# Patient Record
Sex: FEMALE | Race: WHITE | Employment: FULL TIME | URBAN - METROPOLITAN AREA
[De-identification: names, ages, dates, MRNs, and addresses within clinical notes are randomized per-mention and may not be internally consistent; named-entity substitution may affect disease eponyms.]

---

## 2017-01-03 ENCOUNTER — HOSPITAL ENCOUNTER (EMERGENCY)
Age: 27
Discharge: HOME | End: 2017-01-03
Attending: EMERGENCY MEDICINE | Admitting: EMERGENCY MEDICINE
Payer: OTHER GOVERNMENT

## 2017-01-03 ENCOUNTER — APPOINTMENT (OUTPATIENT)
Dept: GENERAL RADIOLOGY | Age: 27
End: 2017-01-03
Attending: EMERGENCY MEDICINE
Payer: OTHER GOVERNMENT

## 2017-01-03 VITALS
HEART RATE: 80 BPM | BODY MASS INDEX: 24.99 KG/M2 | HEIGHT: 63 IN | DIASTOLIC BLOOD PRESSURE: 74 MMHG | WEIGHT: 141.06 LBS | RESPIRATION RATE: 16 BRPM | SYSTOLIC BLOOD PRESSURE: 105 MMHG | OXYGEN SATURATION: 99 % | TEMPERATURE: 97.7 F

## 2017-01-03 DIAGNOSIS — R07.9 CHEST PAIN, UNSPECIFIED TYPE: Primary | ICD-10-CM

## 2017-01-03 LAB
ALBUMIN SERPL BCP-MCNC: 3.7 G/DL (ref 3.5–5)
ALBUMIN/GLOB SERPL: 1.1 {RATIO} (ref 1.1–2.2)
ALP SERPL-CCNC: 50 U/L (ref 45–117)
ALT SERPL-CCNC: 23 U/L (ref 12–78)
ANION GAP BLD CALC-SCNC: 8 MMOL/L (ref 5–15)
AST SERPL W P-5'-P-CCNC: 12 U/L (ref 15–37)
ATRIAL RATE: 69 BPM
BASOPHILS # BLD AUTO: 0 K/UL (ref 0–0.1)
BASOPHILS # BLD: 0 % (ref 0–1)
BILIRUB SERPL-MCNC: 0.3 MG/DL (ref 0.2–1)
BUN SERPL-MCNC: 10 MG/DL (ref 6–20)
BUN/CREAT SERPL: 15 (ref 12–20)
CALCIUM SERPL-MCNC: 8.6 MG/DL (ref 8.5–10.1)
CALCULATED P AXIS, ECG09: 34 DEGREES
CALCULATED R AXIS, ECG10: 45 DEGREES
CALCULATED T AXIS, ECG11: 25 DEGREES
CHLORIDE SERPL-SCNC: 107 MMOL/L (ref 97–108)
CK MB CFR SERPL CALC: 1.8 % (ref 0–2.5)
CK MB SERPL-MCNC: 1.2 NG/ML (ref 5–25)
CK SERPL-CCNC: 67 U/L (ref 26–192)
CO2 SERPL-SCNC: 27 MMOL/L (ref 21–32)
CREAT SERPL-MCNC: 0.65 MG/DL (ref 0.55–1.02)
D DIMER PPP FEU-MCNC: <0.17 MG/L FEU (ref 0–0.65)
DIAGNOSIS, 93000: NORMAL
EOSINOPHIL # BLD: 0.2 K/UL (ref 0–0.4)
EOSINOPHIL NFR BLD: 2 % (ref 0–7)
ERYTHROCYTE [DISTWIDTH] IN BLOOD BY AUTOMATED COUNT: 12.9 % (ref 11.5–14.5)
GLOBULIN SER CALC-MCNC: 3.3 G/DL (ref 2–4)
GLUCOSE SERPL-MCNC: 101 MG/DL (ref 65–100)
HCG UR QL: NEGATIVE
HCT VFR BLD AUTO: 37.7 % (ref 35–47)
HGB BLD-MCNC: 12.8 G/DL (ref 11.5–16)
LYMPHOCYTES # BLD AUTO: 24 % (ref 12–49)
LYMPHOCYTES # BLD: 2.2 K/UL (ref 0.8–3.5)
MCH RBC QN AUTO: 30 PG (ref 26–34)
MCHC RBC AUTO-ENTMCNC: 34 G/DL (ref 30–36.5)
MCV RBC AUTO: 88.5 FL (ref 80–99)
MONOCYTES # BLD: 0.5 K/UL (ref 0–1)
MONOCYTES NFR BLD AUTO: 5 % (ref 5–13)
NEUTS SEG # BLD: 6.2 K/UL (ref 1.8–8)
NEUTS SEG NFR BLD AUTO: 69 % (ref 32–75)
P-R INTERVAL, ECG05: 138 MS
PLATELET # BLD AUTO: 232 K/UL (ref 150–400)
POTASSIUM SERPL-SCNC: 3.7 MMOL/L (ref 3.5–5.1)
PROT SERPL-MCNC: 7 G/DL (ref 6.4–8.2)
Q-T INTERVAL, ECG07: 394 MS
QRS DURATION, ECG06: 80 MS
QTC CALCULATION (BEZET), ECG08: 422 MS
RBC # BLD AUTO: 4.26 M/UL (ref 3.8–5.2)
SODIUM SERPL-SCNC: 142 MMOL/L (ref 136–145)
TROPONIN I SERPL-MCNC: <0.04 NG/ML
VENTRICULAR RATE, ECG03: 69 BPM
WBC # BLD AUTO: 9 K/UL (ref 3.6–11)

## 2017-01-03 PROCEDURE — 74011250636 HC RX REV CODE- 250/636: Performed by: EMERGENCY MEDICINE

## 2017-01-03 PROCEDURE — 93005 ELECTROCARDIOGRAM TRACING: CPT

## 2017-01-03 PROCEDURE — 82550 ASSAY OF CK (CPK): CPT | Performed by: EMERGENCY MEDICINE

## 2017-01-03 PROCEDURE — 99284 EMERGENCY DEPT VISIT MOD MDM: CPT

## 2017-01-03 PROCEDURE — 71020 XR CHEST PA LAT: CPT

## 2017-01-03 PROCEDURE — 85025 COMPLETE CBC W/AUTO DIFF WBC: CPT | Performed by: EMERGENCY MEDICINE

## 2017-01-03 PROCEDURE — 96374 THER/PROPH/DIAG INJ IV PUSH: CPT

## 2017-01-03 PROCEDURE — 80053 COMPREHEN METABOLIC PANEL: CPT | Performed by: EMERGENCY MEDICINE

## 2017-01-03 PROCEDURE — 36415 COLL VENOUS BLD VENIPUNCTURE: CPT | Performed by: EMERGENCY MEDICINE

## 2017-01-03 PROCEDURE — 81025 URINE PREGNANCY TEST: CPT

## 2017-01-03 PROCEDURE — 84484 ASSAY OF TROPONIN QUANT: CPT | Performed by: EMERGENCY MEDICINE

## 2017-01-03 PROCEDURE — 85379 FIBRIN DEGRADATION QUANT: CPT | Performed by: EMERGENCY MEDICINE

## 2017-01-03 RX ORDER — IBUPROFEN 600 MG/1
600 TABLET ORAL
Qty: 20 TAB | Refills: 0 | Status: SHIPPED | OUTPATIENT
Start: 2017-01-03 | End: 2017-01-29

## 2017-01-03 RX ORDER — KETOROLAC TROMETHAMINE 30 MG/ML
30 INJECTION, SOLUTION INTRAMUSCULAR; INTRAVENOUS
Status: COMPLETED | OUTPATIENT
Start: 2017-01-03 | End: 2017-01-03

## 2017-01-03 RX ADMIN — SODIUM CHLORIDE 1000 ML: 900 INJECTION, SOLUTION INTRAVENOUS at 16:46

## 2017-01-03 RX ADMIN — KETOROLAC TROMETHAMINE 30 MG: 30 INJECTION, SOLUTION INTRAMUSCULAR at 16:47

## 2017-01-03 NOTE — ED NOTES
Patient given discharge instructions. No questions or concerns at this time. Patient's VSS and in no acute distress. Patient ambulatory out of unit at discharge.

## 2017-01-03 NOTE — ED TRIAGE NOTES
Mid chest pain and sob started about 2 hours ago, +dry cough, denies fever, +n/v , denies long vehicle rides, denies leg/calf pain

## 2017-01-03 NOTE — DISCHARGE INSTRUCTIONS
Chest Pain: Care Instructions  Your Care Instructions  There are many things that can cause chest pain. Some are not serious and will get better on their own in a few days. But some kinds of chest pain need more testing and treatment. Your doctor may have recommended a follow-up visit in the next 8 to 12 hours. If you are not getting better, you may need more tests or treatment. Even though your doctor has released you, you still need to watch for any problems. The doctor carefully checked you, but sometimes problems can develop later. If you have new symptoms or if your symptoms do not get better, get medical care right away. If you have worse or different chest pain or pressure that lasts more than 5 minutes or you passed out (lost consciousness), call 911 or seek other emergency help right away. A medical visit is only one step in your treatment. Even if you feel better, you still need to do what your doctor recommends, such as going to all suggested follow-up appointments and taking medicines exactly as directed. This will help you recover and help prevent future problems. How can you care for yourself at home? · Rest until you feel better. · Take your medicine exactly as prescribed. Call your doctor if you think you are having a problem with your medicine. · Do not drive after taking a prescription pain medicine. When should you call for help? Call 911 if:  · You passed out (lost consciousness). · You have severe difficulty breathing. · You have symptoms of a heart attack. These may include:  ¨ Chest pain or pressure, or a strange feeling in your chest.  ¨ Sweating. ¨ Shortness of breath. ¨ Nausea or vomiting. ¨ Pain, pressure, or a strange feeling in your back, neck, jaw, or upper belly or in one or both shoulders or arms. ¨ Lightheadedness or sudden weakness. ¨ A fast or irregular heartbeat.   After you call 911, the  may tell you to chew 1 adult-strength or 2 to 4 low-dose aspirin. Wait for an ambulance. Do not try to drive yourself. Call your doctor today if:  · You have any trouble breathing. · Your chest pain gets worse. · You are dizzy or lightheaded, or you feel like you may faint. · You are not getting better as expected. · You are having new or different chest pain. Where can you learn more? Go to http://jadyn-dannie.info/. Enter A120 in the search box to learn more about \"Chest Pain: Care Instructions. \"  Current as of: May 27, 2016  Content Version: 11.1  © 5318-3431 [employer]. Care instructions adapted under license by [employer] (which disclaims liability or warranty for this information). If you have questions about a medical condition or this instruction, always ask your healthcare professional. [address] any warranty or liability for your use of this information. We hope that we have addressed all of your medical concerns. The examination and treatment you received in the Emergency Department were for an emergent problem and were not intended as complete care. It is important that you follow up with your healthcare provider(s) for ongoing care. If your symptoms worsen or do not improve as expected, and you are unable to reach your usual health care provider(s), you should return to the Emergency Department. Today's healthcare is undergoing tremendous change, and patient satisfaction surveys are one of the many tools to assess the quality of medical care. You may receive a survey from the [employer] organization regarding your experience in the Emergency Department. I hope that your experience has been completely positive, particularly the medical care that I provided. As such, please participate in the survey; anything less than excellent does not meet my expectations or intentions.         [address] [facility] and [facility] participate in nationally recognized quality of care measures. If your blood pressure is greater than 120/80, as reported below, we urge that you seek medical care to address the potential of high blood pressure, commonly known as hypertension. Hypertension can be hereditary or can be caused by certain medical conditions, pain, stress, or \"white coat syndrome. \"       Please make an appointment with your health care provider(s) for follow up of your Emergency Department visit. VITALS:   Patient Vitals for the past 8 hrs:   Temp Resp BP SpO2   01/03/17 1514 97.6 °F (36.4 °C) 16 108/71 98 %          Thank you for allowing us to provide you with medical care today. We realize that you have many choices for your emergency care needs. Please choose us in the future for any continued health care needs.       Leatha Miranda MD    [facility]   Office: [phone]            Recent Results (from the past 24 hour(s))   EKG, 12 LEAD, INITIAL    Collection Time: 01/03/17  3:19 PM   Result Value Ref Range    Ventricular Rate 69 BPM    Atrial Rate 69 BPM    P-R Interval 138 ms    QRS Duration 80 ms    Q-T Interval 394 ms    QTC Calculation (Bezet) 422 ms    Calculated P Axis 34 degrees    Calculated R Axis 45 degrees    Calculated T Axis 25 degrees    Diagnosis       Normal sinus rhythm  When compared with ECG of 23-DEC-2016 14:46,  No significant change was found  Confirmed by Norberto Luis MD, [facility] ([id number]) on 1/3/2017 4:03:18 PM     CBC WITH AUTOMATED DIFF    Collection Time: 01/03/17  3:25 PM   Result Value Ref Range    WBC 9.0 3.6 - 11.0 K/uL    RBC 4.26 3.80 - 5.20 M/uL    HGB 12.8 11.5 - 16.0 g/dL    HCT 37.7 35.0 - 47.0 %    MCV 88.5 80.0 - 99.0 FL    MCH 30.0 26.0 - 34.0 PG    MCHC 34.0 30.0 - 36.5 g/dL    RDW 12.9 11.5 - 14.5 %    PLATELET 074 883 - 317 K/uL    NEUTROPHILS 69 32 - 75 %    LYMPHOCYTES 24 12 - 49 %    MONOCYTES 5 5 - 13 %    EOSINOPHILS 2 0 - 7 %    BASOPHILS 0 0 - 1 % ABS. NEUTROPHILS 6.2 1.8 - 8.0 K/UL    ABS. LYMPHOCYTES 2.2 0.8 - 3.5 K/UL    ABS. MONOCYTES 0.5 0.0 - 1.0 K/UL    ABS. EOSINOPHILS 0.2 0.0 - 0.4 K/UL    ABS. BASOPHILS 0.0 0.0 - 0.1 K/UL   METABOLIC PANEL, COMPREHENSIVE    Collection Time: 01/03/17  3:25 PM   Result Value Ref Range    Sodium 142 136 - 145 mmol/L    Potassium 3.7 3.5 - 5.1 mmol/L    Chloride 107 97 - 108 mmol/L    CO2 27 21 - 32 mmol/L    Anion gap 8 5 - 15 mmol/L    Glucose 101 (H) 65 - 100 mg/dL    BUN 10 6 - 20 MG/DL    Creatinine 0.65 0.55 - 1.02 MG/DL    BUN/Creatinine ratio 15 12 - 20      GFR est AA >60 >60 ml/min/1.73m2    GFR est non-AA >60 >60 ml/min/1.73m2    Calcium 8.6 8.5 - 10.1 MG/DL    Bilirubin, total 0.3 0.2 - 1.0 MG/DL    ALT 23 12 - 78 U/L    AST 12 (L) 15 - 37 U/L    Alk. phosphatase 50 45 - 117 U/L    Protein, total 7.0 6.4 - 8.2 g/dL    Albumin 3.7 3.5 - 5.0 g/dL    Globulin 3.3 2.0 - 4.0 g/dL    A-G Ratio 1.1 1.1 - 2.2     TROPONIN I    Collection Time: 01/03/17  3:25 PM   Result Value Ref Range    Troponin-I, Qt. <0.04 <0.05 ng/mL   CK W/ CKMB & INDEX    Collection Time: 01/03/17  3:25 PM   Result Value Ref Range    CK 67 26 - 192 U/L    CK - MB 1.2 <3.6 NG/ML    CK-MB Index 1.8 0 - 2.5     D DIMER    Collection Time: 01/03/17  3:25 PM   Result Value Ref Range    D-dimer <0.17 0.00 - 0.65 mg/L FEU   HCG URINE, QL. - POC    Collection Time: 01/03/17  4:35 PM   Result Value Ref Range    Pregnancy test,urine (POC) NEGATIVE  NEG         Xr Chest Pa Lat    Result Date: 1/3/2017  Indication:  chest pain Exam: PA and lateral views of the chest. Direct comparison is made to prior CXR dated December 23, 2016. Findings: Cardiomediastinal silhouette is within normal limits. Lungs are clear bilaterally. Pleural spaces are normal. Osseous structures are intact. IMPRESSION: No acute cardiopulmonary disease.

## 2017-01-03 NOTE — ED PROVIDER NOTES
HPI Comments: 27 y.o. female with past medical history significant for migraines who presents from home with chief complaint of CP. Pt complains of sharp, constant, non-radiating CP with associated SOB and 1 episode of vomiting that began 3 hours PTA. Pt states CP is worse with breathing, and there are no relieving factors. Pt says she has been seen 2 other times within the past 2 weeks and diagnosed with a viral infection and strep throat. Pt has had dry cough for the past 2 weeks. Pt denies oral contraceptives or pain medicine. Pt denies leg swelling. There are no other acute medical concerns at this time. Note written by Bud. Waleska Tyler, as dictated by Debora Cho MD 4:11 PM      The history is provided by the patient. No  was used. Past Medical History:   Diagnosis Date    Migraines        Past Surgical History:   Procedure Laterality Date    Hx wisdom teeth extraction           History reviewed. No pertinent family history. Social History     Social History    Marital status: SINGLE     Spouse name: N/A    Number of children: N/A    Years of education: N/A     Occupational History    Not on file. Social History Main Topics    Smoking status: Never Smoker    Smokeless tobacco: Not on file    Alcohol use Yes    Drug use: Not on file    Sexual activity: Not on file     Other Topics Concern    Not on file     Social History Narrative         ALLERGIES: Imitrex [sumatriptan succinate]    Review of Systems   Constitutional: Negative for fever. HENT: Negative for facial swelling. Eyes: Negative for visual disturbance. Respiratory: Positive for cough and shortness of breath. Negative for chest tightness. Cardiovascular: Positive for chest pain. Negative for leg swelling. Gastrointestinal: Positive for vomiting. Negative for abdominal pain. Genitourinary: Negative for dysuria. Musculoskeletal: Negative for arthralgias.    Skin: Negative for rash.   Neurological: Negative for dizziness. Hematological: Negative for adenopathy. Psychiatric/Behavioral: Negative for suicidal ideas. All other systems reviewed and are negative. Vitals:    01/03/17 1514   BP: 108/71   Resp: 16   Temp: 97.6 °F (36.4 °C)   SpO2: 98%   Weight: 64 kg (141 lb 1 oz)   Height: 5' 3\" (1.6 m)            Physical Exam   Constitutional: She is oriented to person, place, and time. She appears well-developed and well-nourished. No distress. HENT:   Head: Normocephalic and atraumatic. Mouth/Throat: Oropharynx is clear and moist.   Eyes: Pupils are equal, round, and reactive to light. No scleral icterus. Neck: Normal range of motion. Neck supple. No thyromegaly present. Cardiovascular: Normal rate, regular rhythm, normal heart sounds and intact distal pulses. No murmur heard. Pulmonary/Chest: Effort normal and breath sounds normal. No respiratory distress. Abdominal: Soft. Bowel sounds are normal. She exhibits no distension. There is no tenderness. Musculoskeletal: Normal range of motion. She exhibits no edema. Neurological: She is alert and oriented to person, place, and time. Skin: Skin is warm and dry. No rash noted. She is not diaphoretic. Nursing note and vitals reviewed. Note written by Bud. Stefan Santoyo, as dictated by Precious Guy MD 4:11 PM        MDM  Number of Diagnoses or Management Options  Chest pain, unspecified type:   Diagnosis management comments: A:  Substernal CP for past few hours. Has been treated for URI/bronchitis over past 2 weeks. VS currently normal.  No h/o DVT. Not on birth control.  + SOB. DDx:  Pleurisy, effusion, pneumonia, msk pain, VTE    P:  ecg  cxr  Labs  D-dimer  Toradol/ivf  reassess    ED Course       Procedures    Labs and xray unremarkable. 4:57 PM  Patient resting comfortably with no complaints at this time. VS remain stable. Repeat physical exam is unremarkable.   Pt ambulatory without difficulty and tolerating po's well.

## 2017-01-29 ENCOUNTER — APPOINTMENT (OUTPATIENT)
Dept: GENERAL RADIOLOGY | Age: 27
End: 2017-01-29
Attending: EMERGENCY MEDICINE
Payer: OTHER GOVERNMENT

## 2017-01-29 ENCOUNTER — HOSPITAL ENCOUNTER (EMERGENCY)
Age: 27
Discharge: HOME | End: 2017-01-29
Attending: EMERGENCY MEDICINE | Admitting: EMERGENCY MEDICINE
Payer: OTHER GOVERNMENT

## 2017-01-29 VITALS
SYSTOLIC BLOOD PRESSURE: 105 MMHG | OXYGEN SATURATION: 97 % | DIASTOLIC BLOOD PRESSURE: 68 MMHG | HEART RATE: 89 BPM | BODY MASS INDEX: 24.45 KG/M2 | HEIGHT: 63 IN | RESPIRATION RATE: 16 BRPM | TEMPERATURE: 98.8 F | WEIGHT: 138 LBS

## 2017-01-29 DIAGNOSIS — R07.89 CHEST WALL PAIN: ICD-10-CM

## 2017-01-29 DIAGNOSIS — J02.9 ACUTE PHARYNGITIS, UNSPECIFIED ETIOLOGY: Primary | ICD-10-CM

## 2017-01-29 LAB
ALBUMIN SERPL BCP-MCNC: 4.2 G/DL (ref 3.5–5)
ALBUMIN/GLOB SERPL: 1.3 {RATIO} (ref 1.1–2.2)
ALP SERPL-CCNC: 54 U/L (ref 45–117)
ALT SERPL-CCNC: 19 U/L (ref 12–78)
ANION GAP BLD CALC-SCNC: 7 MMOL/L (ref 5–15)
AST SERPL W P-5'-P-CCNC: 7 U/L (ref 15–37)
BASOPHILS # BLD AUTO: 0 K/UL (ref 0–0.1)
BASOPHILS # BLD: 0 % (ref 0–1)
BILIRUB SERPL-MCNC: 0.3 MG/DL (ref 0.2–1)
BUN SERPL-MCNC: 13 MG/DL (ref 6–20)
BUN/CREAT SERPL: 16 (ref 12–20)
CALCIUM SERPL-MCNC: 8.5 MG/DL (ref 8.5–10.1)
CHLORIDE SERPL-SCNC: 103 MMOL/L (ref 97–108)
CO2 SERPL-SCNC: 28 MMOL/L (ref 21–32)
CREAT SERPL-MCNC: 0.81 MG/DL (ref 0.55–1.02)
EOSINOPHIL # BLD: 0.3 K/UL (ref 0–0.4)
EOSINOPHIL NFR BLD: 2 % (ref 0–7)
ERYTHROCYTE [DISTWIDTH] IN BLOOD BY AUTOMATED COUNT: 12.5 % (ref 11.5–14.5)
GLOBULIN SER CALC-MCNC: 3.2 G/DL (ref 2–4)
GLUCOSE SERPL-MCNC: 95 MG/DL (ref 65–100)
HCG UR QL: NEGATIVE
HCT VFR BLD AUTO: 39.4 % (ref 35–47)
HGB BLD-MCNC: 13.5 G/DL (ref 11.5–16)
LYMPHOCYTES # BLD AUTO: 10 % (ref 12–49)
LYMPHOCYTES # BLD: 1.7 K/UL (ref 0.8–3.5)
MCH RBC QN AUTO: 30.6 PG (ref 26–34)
MCHC RBC AUTO-ENTMCNC: 34.3 G/DL (ref 30–36.5)
MCV RBC AUTO: 89.3 FL (ref 80–99)
MONOCYTES # BLD: 0.9 K/UL (ref 0–1)
MONOCYTES NFR BLD AUTO: 6 % (ref 5–13)
NEUTS SEG # BLD: 13.9 K/UL (ref 1.8–8)
NEUTS SEG NFR BLD AUTO: 82 % (ref 32–75)
PLATELET # BLD AUTO: 249 K/UL (ref 150–400)
POTASSIUM SERPL-SCNC: 3.5 MMOL/L (ref 3.5–5.1)
PROT SERPL-MCNC: 7.4 G/DL (ref 6.4–8.2)
RBC # BLD AUTO: 4.41 M/UL (ref 3.8–5.2)
S PYO AG THROAT QL: NEGATIVE
SODIUM SERPL-SCNC: 138 MMOL/L (ref 136–145)
TROPONIN I SERPL-MCNC: <0.04 NG/ML
WBC # BLD AUTO: 16.9 K/UL (ref 3.6–11)

## 2017-01-29 PROCEDURE — 80053 COMPREHEN METABOLIC PANEL: CPT | Performed by: PHYSICIAN ASSISTANT

## 2017-01-29 PROCEDURE — 71020 XR CHEST PA LAT: CPT

## 2017-01-29 PROCEDURE — 87070 CULTURE OTHR SPECIMN AEROBIC: CPT | Performed by: PHYSICIAN ASSISTANT

## 2017-01-29 PROCEDURE — 74011250637 HC RX REV CODE- 250/637: Performed by: PHYSICIAN ASSISTANT

## 2017-01-29 PROCEDURE — 84484 ASSAY OF TROPONIN QUANT: CPT | Performed by: PHYSICIAN ASSISTANT

## 2017-01-29 PROCEDURE — 85025 COMPLETE CBC W/AUTO DIFF WBC: CPT | Performed by: PHYSICIAN ASSISTANT

## 2017-01-29 PROCEDURE — 93005 ELECTROCARDIOGRAM TRACING: CPT

## 2017-01-29 PROCEDURE — 99284 EMERGENCY DEPT VISIT MOD MDM: CPT

## 2017-01-29 PROCEDURE — 87880 STREP A ASSAY W/OPTIC: CPT

## 2017-01-29 PROCEDURE — 74011636637 HC RX REV CODE- 636/637: Performed by: PHYSICIAN ASSISTANT

## 2017-01-29 PROCEDURE — 81025 URINE PREGNANCY TEST: CPT

## 2017-01-29 PROCEDURE — 87147 CULTURE TYPE IMMUNOLOGIC: CPT | Performed by: PHYSICIAN ASSISTANT

## 2017-01-29 RX ORDER — PREDNISONE 20 MG/1
40 TABLET ORAL
Status: COMPLETED | OUTPATIENT
Start: 2017-01-29 | End: 2017-01-29

## 2017-01-29 RX ORDER — AMOXICILLIN AND CLAVULANATE POTASSIUM 875; 125 MG/1; MG/1
1 TABLET, FILM COATED ORAL 2 TIMES DAILY
Qty: 20 TAB | Refills: 0 | Status: SHIPPED | OUTPATIENT
Start: 2017-01-29 | End: 2017-02-08

## 2017-01-29 RX ORDER — PREDNISONE 10 MG/1
TABLET ORAL
Qty: 21 TAB | Refills: 0 | Status: SHIPPED | OUTPATIENT
Start: 2017-01-29 | End: 2017-03-20

## 2017-01-29 RX ORDER — HYDROCODONE BITARTRATE AND ACETAMINOPHEN 5; 325 MG/1; MG/1
1 TABLET ORAL
Status: COMPLETED | OUTPATIENT
Start: 2017-01-29 | End: 2017-01-29

## 2017-01-29 RX ORDER — IBUPROFEN 600 MG/1
600 TABLET ORAL
Qty: 20 TAB | Refills: 0 | Status: SHIPPED | OUTPATIENT
Start: 2017-01-29 | End: 2017-03-20

## 2017-01-29 RX ADMIN — PREDNISONE 40 MG: 20 TABLET ORAL at 21:10

## 2017-01-29 RX ADMIN — HYDROCODONE BITARTRATE AND ACETAMINOPHEN 1 TABLET: 5; 325 TABLET ORAL at 21:10

## 2017-01-29 NOTE — LETTER
[address] 
[address] [place], [place] [address]mind[facility] [id number] 
[phone] Work/School Note Date: 1/29/2017 To Whom It May concern: 
 
Fabricio Crawford was seen and treated today in the emergency room by the following provider(s): 
Attending Provider: Veena Jenkins MD 
Physician Assistant: Mary ESQUEDA JamelFaith, [place]. Fabricio Crawford may return to work on 1/31/2017.  
 
Sincerely, 
 
 
 
 
Talha Poe RN

## 2017-01-30 LAB
ATRIAL RATE: 92 BPM
CALCULATED P AXIS, ECG09: 25 DEGREES
CALCULATED R AXIS, ECG10: 30 DEGREES
CALCULATED T AXIS, ECG11: 7 DEGREES
DIAGNOSIS, 93000: NORMAL
P-R INTERVAL, ECG05: 132 MS
Q-T INTERVAL, ECG07: 362 MS
QRS DURATION, ECG06: 84 MS
QTC CALCULATION (BEZET), ECG08: 447 MS
VENTRICULAR RATE, ECG03: 92 BPM

## 2017-01-30 NOTE — ED TRIAGE NOTES
TRIAGE NOTE: Pt arrives from home with complaint of sore throat, cough, chest pain, SOB, vomiting that all started this morning. Denies fever.

## 2017-01-30 NOTE — ED PROVIDER NOTES
HPI Comments: 42-year-old female presenting ambulatory to the emergency department with complaint of increased sore throat today, cough, shortness of breath described as pain with breathing, and continued chest pain located in the mid chest for the past month. Has associated vomiting 3-4 episodes today. Mild epigastric abdominal pain. No documented fever. Denies ear pain, rash, neck tightness, diarrhea, constipation, dysuria or frequency or urgency. Patient has been evaluated previously for chest pain and shortness of breath. She reports having schedule appointment with cardiology. Minimal improvement in symptoms with ibuprofen. No recognized ill contacts    Patient is a 27 y.o. female presenting with sore throat. The history is provided by the patient. Sore Throat    This is a new problem. The current episode started 6 to 12 hours ago. The problem has not changed since onset. There has been no fever. Associated symptoms include vomiting, congestion, headaches, shortness of breath and cough. Pertinent negatives include no diarrhea, no drooling, no ear discharge, no ear pain, no stridor, no swollen glands, no trouble swallowing and no stiff neck. She has tried NSAIDs for the symptoms. The treatment provided no relief. Past Medical History:   Diagnosis Date    Migraines        Past Surgical History:   Procedure Laterality Date    Hx wisdom teeth extraction           History reviewed. No pertinent family history. Social History     Social History    Marital status: SINGLE     Spouse name: N/A    Number of children: N/A    Years of education: N/A     Occupational History    Not on file.      Social History Main Topics    Smoking status: Never Smoker    Smokeless tobacco: Not on file    Alcohol use Yes    Drug use: Not on file    Sexual activity: Not on file     Other Topics Concern    Not on file     Social History Narrative         ALLERGIES: Imitrex [sumatriptan succinate]    Review of Systems Constitutional: Negative. Negative for chills, fatigue and fever. HENT: Positive for congestion and sore throat. Negative for drooling, ear discharge, ear pain, facial swelling, rhinorrhea, sneezing and trouble swallowing. Eyes: Negative for pain, discharge and itching. Respiratory: Positive for cough and shortness of breath. Negative for chest tightness and stridor. Cardiovascular: Negative. Negative for chest pain and leg swelling. Gastrointestinal: Positive for vomiting. Negative for abdominal distention, abdominal pain, constipation, diarrhea and nausea. Genitourinary: Negative for difficulty urinating, frequency and urgency. Musculoskeletal: Negative for arthralgias, back pain, joint swelling, neck pain and neck stiffness. Skin: Negative for color change and rash. Neurological: Positive for headaches. Negative for dizziness and numbness. Psychiatric/Behavioral: Negative for confusion and decreased concentration. All other systems reviewed and are negative. Vitals:    01/29/17 1925 01/29/17 2230   BP: 120/78 105/68   Pulse: 99 89   Resp: 17 16   Temp: 99.5 °F (37.5 °C) 98.8 °F (37.1 °C)   SpO2: 100% 97%   Weight: 62.6 kg (138 lb)    Height: 5' 3\" (1.6 m)             Physical Exam   Constitutional: She is oriented to person, place, and time. She appears well-developed and well-nourished. No distress. Well appearing adult female in NAD   HENT:   Head: Normocephalic and atraumatic. Right Ear: External ear normal.   Left Ear: External ear normal.   Nose: Nose normal.   Mouth/Throat: Oropharynx is clear and moist. No oropharyngeal exudate. Eyes: Conjunctivae and EOM are normal. Pupils are equal, round, and reactive to light. Right eye exhibits no discharge. Left eye exhibits no discharge. Neck: Normal range of motion. Neck supple. Cardiovascular: Normal rate, regular rhythm and normal heart sounds. Exam reveals no friction rub. No murmur heard.   Pulmonary/Chest: Effort normal and breath sounds normal. No respiratory distress. She has no wheezes. She exhibits tenderness (mid chest wall). Abdominal: Soft. Bowel sounds are normal. She exhibits no distension. There is no tenderness. There is no rebound. Musculoskeletal: Normal range of motion. Neurological: She is alert and oriented to person, place, and time. Skin: Skin is warm and dry. No ecchymosis, no laceration, no lesion and no rash noted. Psychiatric: She has a normal mood and affect. Her behavior is normal.   Nursing note and vitals reviewed. MDM  Number of Diagnoses or Management Options  Acute pharyngitis, unspecified etiology:   Chest wall pain:   Diagnosis management comments: 28 yo female with sore throat, cough and continued chest pain x one month. Plan  POC strep, EKG, trop, chest xray, CBC, CMP, analgesia, prednisone and reassess. Mary Scott, [address]         Amount and/or Complexity of Data Reviewed  Clinical lab tests: ordered and reviewed  Tests in the radiology section of CPT®: ordered and reviewed  Independent visualization of images, tracings, or specimens: yes      ED Course       Procedures  Progress note    EKG interpretation: (Preliminary)  Rhythm: normal sinus rhythm; and regular . Rate (approx.): 85; Axis: normal; P wave: normal; QRS interval: normal ; ST/T wave: non-specific changes; in  Leads. Mary Scott, [address]    Patient's results have been reviewed with them. Patient and/or family have verbally conveyed their understanding and agreement of the patient's signs, symptoms, diagnosis, treatment and prognosis and additionally agree to follow up as recommended or return to the Emergency Room should their condition change prior to follow-up.   Discharge instructions have also been provided to the patient with some educational information regarding their diagnosis as well a list of reasons why they would want to return to the ER prior to their follow-up appointment should their condition change.  April C [place], [place]

## 2017-01-30 NOTE — DISCHARGE INSTRUCTIONS
We hope that we have addressed all of your medical concerns. The examination and treatment you received in the Emergency Department were for an emergent problem and were not intended as complete care. It is important that you follow up with your healthcare provider(s) for ongoing care. If your symptoms worsen or do not improve as expected, and you are unable to reach your usual health care provider(s), you should return to the Emergency Department. Today's healthcare is undergoing tremendous change, and patient satisfaction surveys are one of the many tools to assess the quality of medical care. You may receive a survey from the [employer] regarding your experience in the Emergency Department. I hope that your experience has been completely positive, particularly the medical care that I provided. As such, please participate in the survey; anything less than excellent does not meet my expectations or intentions. [facility] and [facility] participate in nationally recognized quality of care measures. If your blood pressure is greater than 120/80, as reported below, we urge that you seek medical care to address the potential of high blood pressure, commonly known as hypertension. Hypertension can be hereditary or can be caused by certain medical conditions, pain, stress, or \"white coat syndrome. \"       Please make an appointment with your health care provider(s) for follow up of your Emergency Department visit. VITALS:   Patient Vitals for the past 8 hrs:   Temp Pulse Resp BP SpO2   01/29/17 1925 99.5 °F (37.5 °C) 99 17 120/78 100 %          Thank you for allowing us to provide you with medical care today. We realize that you have many choices for your emergency care needs. Please choose us in the future for any continued health care needs. Regards,           April DHEERAJ.  [place], [address] [facility]. Office: [phone]            Recent Results (from the past 24 hour(s))   EKG, 12 LEAD, INITIAL    Collection Time: 01/29/17  7:56 PM   Result Value Ref Range    Ventricular Rate 92 BPM    Atrial Rate 92 BPM    P-R Interval 132 ms    QRS Duration 84 ms    Q-T Interval 362 ms    QTC Calculation (Bezet) 447 ms    Calculated P Axis 25 degrees    Calculated R Axis 30 degrees    Calculated T Axis 7 degrees    Diagnosis       Normal sinus rhythm  Nonspecific ST and T wave abnormality  When compared with ECG of 03-JAN-2017 15:19,  T wave inversion now evident in Anterior leads     HCG URINE, QL. - POC    Collection Time: 01/29/17  8:05 PM   Result Value Ref Range    Pregnancy test,urine (POC) NEGATIVE  NEG     POC GROUP A STREP    Collection Time: 01/29/17  8:05 PM   Result Value Ref Range    Group A strep (POC) NEGATIVE  NEG     CBC WITH AUTOMATED DIFF    Collection Time: 01/29/17  9:05 PM   Result Value Ref Range    WBC 16.9 (H) 3.6 - 11.0 K/uL    RBC 4.41 3.80 - 5.20 M/uL    HGB 13.5 11.5 - 16.0 g/dL    HCT 39.4 35.0 - 47.0 %    MCV 89.3 80.0 - 99.0 FL    MCH 30.6 26.0 - 34.0 PG    MCHC 34.3 30.0 - 36.5 g/dL    RDW 12.5 11.5 - 14.5 %    PLATELET 483 770 - 291 K/uL    NEUTROPHILS 82 (H) 32 - 75 %    LYMPHOCYTES 10 (L) 12 - 49 %    MONOCYTES 6 5 - 13 %    EOSINOPHILS 2 0 - 7 %    BASOPHILS 0 0 - 1 %    ABS. NEUTROPHILS 13.9 (H) 1.8 - 8.0 K/UL    ABS. LYMPHOCYTES 1.7 0.8 - 3.5 K/UL    ABS. MONOCYTES 0.9 0.0 - 1.0 K/UL    ABS. EOSINOPHILS 0.3 0.0 - 0.4 K/UL    ABS.  BASOPHILS 0.0 0.0 - 0.1 K/UL   METABOLIC PANEL, COMPREHENSIVE    Collection Time: 01/29/17  9:05 PM   Result Value Ref Range    Sodium 138 136 - 145 mmol/L    Potassium 3.5 3.5 - 5.1 mmol/L    Chloride 103 97 - 108 mmol/L    CO2 28 21 - 32 mmol/L    Anion gap 7 5 - 15 mmol/L    Glucose 95 65 - 100 mg/dL    BUN 13 6 - 20 MG/DL    Creatinine 0.81 0.55 - 1.02 MG/DL    BUN/Creatinine ratio 16 12 - 20      GFR est AA >60 >60 ml/min/1.73m2    GFR est non-AA >60 >60 ml/min/1.73m2    Calcium 8.5 8.5 - 10.1 MG/DL    Bilirubin, total 0.3 0.2 - 1.0 MG/DL    ALT 19 12 - 78 U/L    AST 7 (L) 15 - 37 U/L    Alk. phosphatase 54 45 - 117 U/L    Protein, total 7.4 6.4 - 8.2 g/dL    Albumin 4.2 3.5 - 5.0 g/dL    Globulin 3.2 2.0 - 4.0 g/dL    A-G Ratio 1.3 1.1 - 2.2     TROPONIN I    Collection Time: 01/29/17  9:05 PM   Result Value Ref Range    Troponin-I, Qt. <0.04 <0.05 ng/mL       Xr Chest Pa Lat    Result Date: 1/29/2017  EXAM:  XR CHEST PA LAT INDICATION:  Cough with chest pain and shortness of breath since this morning. COMPARISON: 1/3/2017 TECHNIQUE: PA and lateral chest views FINDINGS: The cardiomediastinal contours are stable. The pulmonary vasculature is within normal limits. The lungs and pleural spaces are clear. The bones and upper abdomen are stable. IMPRESSION: There is no acute process. Stable exam.         Musculoskeletal Chest Pain: Care Instructions  Your Care Instructions  Chest pain is not always a sign that something is wrong with your heart or that you have another serious problem. The doctor thinks your chest pain is caused by strained muscles or ligaments, inflamed chest cartilage, or another problem in your chest, rather than by your heart. You may need more tests to find the cause of your chest pain. Follow-up care is a key part of your treatment and safety. Be sure to make and go to all appointments, and call your doctor if you are having problems. Its also a good idea to know your test results and keep a list of the medicines you take. How can you care for yourself at home? · Take pain medicines exactly as directed. ¨ If the doctor gave you a prescription medicine for pain, take it as prescribed. ¨ If you are not taking a prescription pain medicine, ask your doctor if you can take an over-the-counter medicine. · Rest and protect the sore area. · Stop, change, or take a break from any activity that may be causing your pain or soreness.   · Put ice or a cold pack on the sore area for 10 to 20 minutes at a time. Try to do this every 1 to 2 hours for the next 3 days (when you are awake) or until the swelling goes down. Put a thin cloth between the ice and your skin. · After 2 or 3 days, apply a heating pad set on low or a warm cloth to the area that hurts. Some doctors suggest that you go back and forth between hot and cold. · Do not wrap or tape your ribs for support. This may cause you to take smaller breaths, which could increase your risk of lung problems. · Mentholated creams such as Bengay or Icy Hot may soothe sore muscles. Follow the instructions on the package. · Follow your doctor's instructions for exercising. · Gentle stretching and massage may help you get better faster. Stretch slowly to the point just before pain begins, and hold the stretch for at least 15 to 30 seconds. Do this 3 or 4 times a day. Stretch just after you have applied heat. · As your pain gets better, slowly return to your normal activities. Any increased pain may be a sign that you need to rest a while longer. When should you call for help? Call 911 anytime you think you may need emergency care. For example, call if:  · You have chest pain or pressure. This may occur with:  ¨ Sweating. ¨ Shortness of breath. ¨ Nausea or vomiting. ¨ Pain that spreads from the chest to the neck, jaw, or one or both shoulders or arms. ¨ Dizziness or lightheadedness. ¨ A fast or uneven pulse. After calling 911, chew 1 adult-strength aspirin. Wait for an ambulance. Do not try to drive yourself. · You have sudden chest pain and shortness of breath, or you cough up blood. Call your doctor now or seek immediate medical care if:  · You have any trouble breathing. · Your chest pain gets worse.   · Your chest pain occurs consistently with exercise and is relieved by rest.  Watch closely for changes in your health, and be sure to contact your doctor if:  · Your chest pain does not get better after 1 week. Where can you learn more? Go to http://jadyn-dannie.info/. Enter V293 in the search box to learn more about \"Musculoskeletal Chest Pain: Care Instructions. \"  Current as of: May 27, 2016  Content Version: 11.1  © 7287-2813 [employer]. Care instructions adapted under license by [employer] (which disclaims liability or warranty for this information). If you have questions about a medical condition or this instruction, always ask your healthcare professional. [address] any warranty or liability for your use of this information. Sore Throat: Care Instructions  Your Care Instructions    Infection by bacteria or a virus causes most sore throats. Cigarette smoke, dry air, air pollution, allergies, and yelling can also cause a sore throat. Sore throats can be painful and annoying. Fortunately, most sore throats go away on their own. If you have a bacterial infection, your doctor may prescribe antibiotics. Follow-up care is a key part of your treatment and safety. Be sure to make and go to all appointments, and call your doctor if you are having problems. It's also a good idea to know your test results and keep a list of the medicines you take. How can you care for yourself at home? · If your doctor prescribed antibiotics, take them as directed. Do not stop taking them just because you feel better. You need to take the full course of antibiotics. · Gargle with warm salt water once an hour to help reduce swelling and relieve discomfort. Use 1 teaspoon of salt mixed in 1 cup of warm water. · Take an over-the-counter pain medicine, such as acetaminophen (Tylenol), ibuprofen (Advil, Motrin), or naproxen (Aleve). Read and follow all instructions on the label. · Be careful when taking over-the-counter cold or flu medicines and Tylenol at the same time. Many of these medicines have acetaminophen, which is Tylenol.  Read the labels to make sure that you are not taking more than the recommended dose. Too much acetaminophen (Tylenol) can be harmful. · Drink plenty of fluids. Fluids may help soothe an irritated throat. Hot fluids, such as tea or soup, may help decrease throat pain. · Use over-the-counter throat lozenges to soothe pain. Regular cough drops or hard candy may also help. These should not be given to young children because of the risk of choking. · Do not smoke or allow others to smoke around you. If you need help quitting, talk to your doctor about stop-smoking programs and medicines. These can increase your chances of quitting for good. · Use a vaporizer or humidifier to add moisture to your bedroom. Follow the directions for cleaning the machine. When should you call for help? Call your doctor now or seek immediate medical care if:  · You have new or worse trouble swallowing. · Your sore throat gets much worse on one side. Watch closely for changes in your health, and be sure to contact your doctor if you do not get better as expected. Where can you learn more? Go to http://jadyn-dannie.info/. Enter [phone] [id number] [phone] in the search box to learn more about \"Sore Throat: Care Instructions. \"  Current as of: July 29, 2016  Content Version: 11.1  © 7896-3664 [employer]. Care instructions adapted under license by [email or web address] (which disclaims liability or warranty for this information). If you have questions about a medical condition or this instruction, always ask your healthcare professional. [place] [address] any warranty or liability for your use of this information.

## 2017-01-31 LAB
BACTERIA SPEC CULT: NORMAL
BACTERIA SPEC CULT: NORMAL
SERVICE CMNT-IMP: NORMAL

## 2017-03-20 ENCOUNTER — HOSPITAL ENCOUNTER (EMERGENCY)
Age: 27
Discharge: HOME | End: 2017-03-20
Attending: EMERGENCY MEDICINE
Payer: OTHER GOVERNMENT

## 2017-03-20 VITALS
BODY MASS INDEX: 24.63 KG/M2 | HEART RATE: 81 BPM | HEIGHT: 63 IN | OXYGEN SATURATION: 98 % | WEIGHT: 139 LBS | TEMPERATURE: 98.9 F | SYSTOLIC BLOOD PRESSURE: 113 MMHG | RESPIRATION RATE: 18 BRPM | DIASTOLIC BLOOD PRESSURE: 76 MMHG

## 2017-03-20 DIAGNOSIS — G62.9 PERIPHERAL POLYNEUROPATHY: Primary | ICD-10-CM

## 2017-03-20 PROCEDURE — 99283 EMERGENCY DEPT VISIT LOW MDM: CPT

## 2017-03-20 RX ORDER — METHYLPREDNISOLONE 4 MG/1
TABLET ORAL
Qty: 1 DOSE PACK | Refills: 0 | Status: SHIPPED | OUTPATIENT
Start: 2017-03-20 | End: 2017-08-19

## 2017-03-21 NOTE — ED PROVIDER NOTES
Patient is a 27 y.o. female presenting with numbness. The history is provided by the patient. Numbness   This is a new problem. Episode onset: over 2 weeks. Progression since onset: waxing and waning but gradually worsening. Affected Side: both feet. Pertinent negatives include no focal weakness. There has been no fever. Pertinent negatives include no shortness of breath, no chest pain, no vomiting, no altered mental status, no confusion, no headaches, no nausea and no bowel incontinence. Past Medical History:   Diagnosis Date    Migraines        Past Surgical History:   Procedure Laterality Date    HX WISDOM TEETH EXTRACTION           History reviewed. No pertinent family history. Social History     Social History    Marital status: SINGLE     Spouse name: N/A    Number of children: N/A    Years of education: N/A     Occupational History    Not on file. Social History Main Topics    Smoking status: Never Smoker    Smokeless tobacco: Not on file    Alcohol use Yes      Comment: Occ    Drug use: No    Sexual activity: Not on file     Other Topics Concern    Not on file     Social History Narrative         ALLERGIES: Other food and Imitrex [sumatriptan succinate]    Review of Systems   Constitutional: Negative for chills and fever. Respiratory: Negative for chest tightness and shortness of breath. Cardiovascular: Negative for chest pain. Gastrointestinal: Negative for abdominal pain, bowel incontinence, nausea and vomiting. Musculoskeletal: Positive for back pain. Negative for neck pain. Neurological: Positive for numbness. Negative for dizziness, focal weakness, weakness and headaches. Psychiatric/Behavioral: Negative for confusion. All other systems reviewed and are negative.       Vitals:    03/20/17 2010   BP: 113/76   Pulse: 81   Resp: 18   Temp: 98.9 °F (37.2 °C)   SpO2: 98%   Weight: 63 kg (139 lb)   Height: 5' 3\" (1.6 m)            Physical Exam   Constitutional: She is oriented to person, place, and time. She appears well-developed and well-nourished. No distress. HENT:   Head: Normocephalic and atraumatic. Eyes: Conjunctivae and EOM are normal.   Neck: Normal range of motion. Cardiovascular: Normal rate, regular rhythm, normal heart sounds and intact distal pulses. No murmur heard. Pulmonary/Chest: Effort normal and breath sounds normal. No stridor. No respiratory distress. Abdominal: Soft. Bowel sounds are normal. There is no tenderness. Musculoskeletal: Normal range of motion. She exhibits no edema or deformity. Mild tenderness in lower lumbar back   Neurological: She is alert and oriented to person, place, and time. She has normal strength and normal reflexes. No cranial nerve deficit. Coordination normal. GCS eye subscore is 4. GCS verbal subscore is 5. GCS motor subscore is 6. Reflex Scores:       Patellar reflexes are 2+ on the right side and 2+ on the left side. Achilles reflexes are 2+ on the right side and 2+ on the left side. Intact to light touch in toes, decreased proprioception  No loss of balance, ambulatory without assistance   Skin: Skin is warm and dry. She is not diaphoretic. Psychiatric: She has a normal mood and affect. Nursing note and vitals reviewed. MDM  Number of Diagnoses or Management Options  Peripheral polyneuropathy:   Diagnosis management comments: Patient with peripheral neuropathy - refer to PCP and her neurologist to discuss symptoms and possibly get a lumbar MRI (she has back pain) will start steroids and see if that helps.   No immediate concern for cauda equina - patient to return if symptoms worsen    Patient Progress  Patient progress: stable    ED Course       Procedures

## 2017-03-21 NOTE — ED TRIAGE NOTES
Pt reports two weeks of intermittent numbness in between her toes and in her feet, bilateral.  Today the numbness spread into her lower legs. Pt denies any known injury to back or feet.

## 2017-03-21 NOTE — DISCHARGE INSTRUCTIONS
Neuropathic Pain: Care Instructions  Your Care Instructions  Neuropathic pain is caused by pressure on or damage to your nerves. It's often simply called nerve pain. Some people feel this type of pain all the time. For others, it comes and goes. Diabetes, shingles, or an injury can cause nerve pain. Many people say the pain feels sharp, burning, or stabbing. But some people feel it as a dull ache. In some cases, it makes your skin very sensitive. So touch, pressure, and other sensations that did not hurt before may now cause pain. It's important to know that this kind of pain is real and can affect your quality of life. It's also important to know that treatment can help. Treatment includes pain medicines, exercise, and physical therapy. Medicines can help reduce the number of pain signals that travel over the nerves. This can make the painful areas less sensitive. It can also help you sleep better and improve your mood. But medicines are only one part of successful treatment. Most people do best with more than one kind of treatment. Your doctor may recommend that you try cognitive-behavioral therapy and stress management. Or, if needed, you may decide to try to quit smoking, lower your blood pressure, or better control blood sugar. These kinds of healthy changes can also make a difference. If you feel that your treatment is not working, talk to your doctor. And be sure to tell your doctor if you think you might be depressed or anxious. These are common problems that can also be treated. Follow-up care is a key part of your treatment and safety. Be sure to make and go to all appointments, and call your doctor if you are having problems. It's also a good idea to know your test results and keep a list of the medicines you take. How can you care for yourself at home? · Be safe with medicines. Read and follow all instructions on the label.   ¨ If the doctor gave you a prescription medicine for pain, take it as prescribed. ¨ If you are not taking a prescription pain medicine, ask your doctor if you can take an over-the-counter medicine. · Save hard tasks for days when you have less pain. Follow a hard task with an easy task. And remember to take breaks. · Relax, and reduce stress. You may want to try deep breathing or meditation. These can help. · Keep moving. Gentle, daily exercise can help reduce pain. Your doctor or physical therapist can tell you what type of exercise is best for you. This may include walking, swimming, and stationary biking. It may also include stretches and range-of-motion exercises. · Try heat, cold packs, and massage. · Get enough sleep. Constant pain can make you more tired. If the pain makes it hard to sleep, talk with your doctor. · Think positively. Your thoughts can affect your pain. Do fun things to distract yourself from the pain. See a movie, read a book, listen to music, or spend time with a friend. · Keep a pain diary. Try to write down how strong your pain is and what it feels like. Also try to notice and write down how your moods, thoughts, sleep, activities, and medicine affect your pain. These notes can help you and your doctor find the best ways to treat your pain. Reducing constipation caused by pain medicine  Pain medicines often cause constipation. To reduce constipation:  · Include fruits, vegetables, beans, and whole grains in your diet each day. These foods are high in fiber. · Drink plenty of fluids, enough so that your urine is light yellow or clear like water. If you have kidney, heart, or liver disease and have to limit fluids, talk with your doctor before you increase the amount of fluids you drink. · Get some exercise every day. Build up slowly to 30 to 60 minutes a day on 5 or more days of the week. · Take a fiber supplement, such as Citrucel or Metamucil, every day if needed. Read and follow all instructions on the label.   · Schedule time each day for a bowel movement. Having a daily routine may help. Take your time and do not strain when having a bowel movement. · Ask your doctor about a laxative. The goal is to have one easy bowel movement every 1 to 2 days. Do not let constipation go untreated for more than 3 days. When should you call for help? Call your doctor now or seek immediate medical care if:  · You feel sad, anxious, or hopeless for more than a few days. This could mean you are depressed. Depression is common in people who have a lot of pain. But it can be treated. · You have trouble with bowel movements, such as:  ¨ No bowel movement in 3 days. ¨ Blood in the anal area, in your stool, or on the toilet paper. ¨ Diarrhea for more than 24 hours. Watch closely for changes in your health, and be sure to contact your doctor if:  · Your pain is getting worse. · You can't sleep because of pain. · You are very worried or anxious about your pain. · You have trouble taking your pain medicine. · You have any concerns about your pain medicine or its side effects. · You have vomiting or cramps for more than 2 hours. Where can you learn more? Go to http://jadyn-dannie.info/. Enter B075 in the search box to learn more about \"Neuropathic Pain: Care Instructions. \"  Current as of: February 19, 2016  Content Version: 11.1  © 6535-1135 [employer]. Care instructions adapted under license by [employer] (which disclaims liability or warranty for this information). If you have questions about a medical condition or this instruction, always ask your healthcare professional. [place] [address] any warranty or liability for your use of this information. We hope that we have addressed all of your medical concerns. The examination and treatment you received in the Emergency Department were for an emergent problem and were not intended as complete care.  It is important that you follow up with your healthcare provider(s) for ongoing care. If your symptoms worsen or do not improve as expected, and you are unable to reach your usual health care provider(s), you should return to the Emergency Department. Today's healthcare is undergoing tremendous change, and patient satisfaction surveys are one of the many tools to assess the quality of medical care. You may receive a survey from the [employer] regarding your experience in the Emergency Department. I hope that your experience has been completely positive, particularly the medical care that I provided. As such, please participate in the survey; anything less than excellent does not meet my expectations or intentions. [address] [facility] and [address] [place] [address] participate in nationally recognized quality of care measures. If your blood pressure is greater than 120/80, as reported below, we urge that you seek medical care to address the potential of high blood pressure, commonly known as hypertension. Hypertension can be hereditary or can be caused by certain medical conditions, pain, stress, or \"white coat syndrome. \"       Please make an appointment with your health care provider(s) for follow up of your Emergency Department visit. VITALS:   Patient Vitals for the past 8 hrs:   Temp Pulse Resp BP SpO2   03/20/17 2010 98.9 °F (37.2 °C) 81 18 113/76 98 %          Thank you for allowing us to provide you with medical care today. We realize that you have many choices for your emergency care needs. Please choose us in the future for any continued health care needs. Kalie Velizhand, [address] [place] [address] 20.   Office: [phone]

## 2017-04-13 ENCOUNTER — APPOINTMENT (OUTPATIENT)
Dept: CT IMAGING | Age: 27
End: 2017-04-13
Attending: NURSE PRACTITIONER
Payer: OTHER GOVERNMENT

## 2017-04-13 ENCOUNTER — HOSPITAL ENCOUNTER (EMERGENCY)
Age: 27
Discharge: HOME | End: 2017-04-13
Attending: EMERGENCY MEDICINE
Payer: OTHER GOVERNMENT

## 2017-04-13 ENCOUNTER — APPOINTMENT (OUTPATIENT)
Dept: MRI IMAGING | Age: 27
End: 2017-04-13
Attending: NURSE PRACTITIONER
Payer: OTHER GOVERNMENT

## 2017-04-13 VITALS
WEIGHT: 133.5 LBS | OXYGEN SATURATION: 99 % | SYSTOLIC BLOOD PRESSURE: 108 MMHG | DIASTOLIC BLOOD PRESSURE: 70 MMHG | TEMPERATURE: 98.8 F | BODY MASS INDEX: 23.65 KG/M2 | HEART RATE: 79 BPM | HEIGHT: 63 IN | RESPIRATION RATE: 16 BRPM

## 2017-04-13 DIAGNOSIS — H53.9 VISUAL CHANGES: Primary | ICD-10-CM

## 2017-04-13 LAB
ALBUMIN SERPL BCP-MCNC: 4.1 G/DL (ref 3.5–5)
ALBUMIN/GLOB SERPL: 1.2 {RATIO} (ref 1.1–2.2)
ALP SERPL-CCNC: 49 U/L (ref 45–117)
ALT SERPL-CCNC: 20 U/L (ref 12–78)
ANION GAP BLD CALC-SCNC: 6 MMOL/L (ref 5–15)
AST SERPL W P-5'-P-CCNC: 9 U/L (ref 15–37)
BASOPHILS # BLD AUTO: 0 K/UL (ref 0–0.1)
BASOPHILS # BLD: 0 % (ref 0–1)
BILIRUB SERPL-MCNC: 0.4 MG/DL (ref 0.2–1)
BUN SERPL-MCNC: 10 MG/DL (ref 6–20)
BUN/CREAT SERPL: 16 (ref 12–20)
CALCIUM SERPL-MCNC: 8.8 MG/DL (ref 8.5–10.1)
CHLORIDE SERPL-SCNC: 106 MMOL/L (ref 97–108)
CO2 SERPL-SCNC: 26 MMOL/L (ref 21–32)
CREAT SERPL-MCNC: 0.63 MG/DL (ref 0.55–1.02)
EOSINOPHIL # BLD: 0.2 K/UL (ref 0–0.4)
EOSINOPHIL NFR BLD: 2 % (ref 0–7)
ERYTHROCYTE [DISTWIDTH] IN BLOOD BY AUTOMATED COUNT: 12.6 % (ref 11.5–14.5)
GLOBULIN SER CALC-MCNC: 3.4 G/DL (ref 2–4)
GLUCOSE SERPL-MCNC: 93 MG/DL (ref 65–100)
HCG UR QL: NEGATIVE
HCT VFR BLD AUTO: 40 % (ref 35–47)
HGB BLD-MCNC: 13.6 G/DL (ref 11.5–16)
LYMPHOCYTES # BLD AUTO: 28 % (ref 12–49)
LYMPHOCYTES # BLD: 2.1 K/UL (ref 0.8–3.5)
MCH RBC QN AUTO: 30.4 PG (ref 26–34)
MCHC RBC AUTO-ENTMCNC: 34 G/DL (ref 30–36.5)
MCV RBC AUTO: 89.3 FL (ref 80–99)
MONOCYTES # BLD: 0.3 K/UL (ref 0–1)
MONOCYTES NFR BLD AUTO: 4 % (ref 5–13)
NEUTS SEG # BLD: 5.1 K/UL (ref 1.8–8)
NEUTS SEG NFR BLD AUTO: 66 % (ref 32–75)
PLATELET # BLD AUTO: 274 K/UL (ref 150–400)
POTASSIUM SERPL-SCNC: 3.8 MMOL/L (ref 3.5–5.1)
PROT SERPL-MCNC: 7.5 G/DL (ref 6.4–8.2)
RBC # BLD AUTO: 4.48 M/UL (ref 3.8–5.2)
SODIUM SERPL-SCNC: 138 MMOL/L (ref 136–145)
WBC # BLD AUTO: 7.7 K/UL (ref 3.6–11)

## 2017-04-13 PROCEDURE — 81025 URINE PREGNANCY TEST: CPT

## 2017-04-13 PROCEDURE — 74011250636 HC RX REV CODE- 250/636

## 2017-04-13 PROCEDURE — 99284 EMERGENCY DEPT VISIT MOD MDM: CPT

## 2017-04-13 PROCEDURE — 36415 COLL VENOUS BLD VENIPUNCTURE: CPT | Performed by: NURSE PRACTITIONER

## 2017-04-13 PROCEDURE — 74011250636 HC RX REV CODE- 250/636: Performed by: NURSE PRACTITIONER

## 2017-04-13 PROCEDURE — 70553 MRI BRAIN STEM W/O & W/DYE: CPT

## 2017-04-13 PROCEDURE — 80053 COMPREHEN METABOLIC PANEL: CPT | Performed by: NURSE PRACTITIONER

## 2017-04-13 PROCEDURE — 70450 CT HEAD/BRAIN W/O DYE: CPT

## 2017-04-13 PROCEDURE — 85025 COMPLETE CBC W/AUTO DIFF WBC: CPT | Performed by: NURSE PRACTITIONER

## 2017-04-13 PROCEDURE — A9585 GADOBUTROL INJECTION: HCPCS | Performed by: NURSE PRACTITIONER

## 2017-04-13 PROCEDURE — A9585 GADOBUTROL INJECTION: HCPCS

## 2017-04-13 RX ADMIN — GADOBUTROL 6 ML: 604.72 INJECTION INTRAVENOUS at 20:00

## 2017-04-13 NOTE — ED TRIAGE NOTES
Pt states that she has been having intermitted blurry vision in both eyes that started about three days ago, pt states that two days ago she \"saw smoke\" in her right eye. Pt states that she had a migraine headache start at 3am, but the headache has stopped now. Pt denies double vision.

## 2017-04-13 NOTE — Clinical Note
Follow up with Neurology as soon as possible Return to the ED if condition worsens and/or new symptoms develop

## 2017-04-14 NOTE — ED NOTES
Pt signed out awaiting MRI; plan to discharge if negative with Neuro followup. MRI with no acute finding; pt to be discharged with Neuro followup; return if change or worsening of symptoms.  RORY Crandall

## 2017-04-14 NOTE — DISCHARGE INSTRUCTIONS
Headache: After Your Visit to the Emergency Room  Your Care Instructions  Headaches have many possible causes. Most headaches are not a sign of serious problems and will get better on their own. Home treatment may help you feel better soon. Even though you have been released from the emergency room, you still need to watch for any problems. The doctor carefully checked you. But sometimes problems can develop later. If you have new symptoms, or if your symptoms do not get better, return to the emergency room or call your doctor right away. A visit to the emergency room is only one step in your treatment. Even if you feel better, you still need to do what your doctor recommends, such as going to all suggested follow-up appointments and taking medicines exactly as directed. This will help you recover and help prevent future problems. How can you care for yourself at home? · Have someone drive you home if you have taken pain medicine. Do not drive yourself. · Rest in a quiet, dark room until your headache is gone. Close your eyes, and try to relax or go to sleep. Do not watch TV or read. · Put ice or a cold pack on the area for 10 to 20 minutes at a time. Put a thin cloth between the ice and your skin. · Use a warm, moist towel or a heating pad set on low to relax tight shoulder and neck muscles. · Have someone gently massage your neck and shoulders. · Take pain medicines exactly as directed. ¨ If the doctor gave you a prescription medicine for pain, take it as prescribed. ¨ If you are not taking a prescription pain medicine, ask your doctor if you can take an over-the-counter medicine. · Limit your caffeine intake to 1 to 2 cups a day. People who drink a lot of caffeine often get a headache several hours after their last drink of caffeine. Or they may wake up with a headache that is relieved by drinking caffeine. Cut down slowly to avoid caffeine-withdrawal headaches.   · Seek help if you have depression or anxiety. Your headaches may be linked to these conditions. When should you call for help? Call 911 if:  · You have signs of a stroke. These may include:  ¨ Sudden numbness, paralysis, or weakness in your face, arm, or leg, especially on only one side of your body. ¨ New problems with walking or balance. ¨ Sudden vision changes. ¨ Drooling or slurred speech. ¨ New problems speaking or understanding simple statements, or feeling confused. ¨ A sudden, severe headache that is different from past headaches. · You have other symptoms that you think are a medical emergency. Return to the emergency room now if:  · You get a fever and a stiff neck. · Your headache gets worse. · You have new nausea and vomiting, or you cannot keep down food or liquids. Call your doctor today if:  · Your headache does not get better within 1 to 2 days. · Your headaches get worse or happen more often. Where can you learn more? Go to Knozen.be  Enter D038 in the search box to learn more about \"Headache: After Your Visit to the Emergency Room. \"   © 4833-8647 Healthwise, Incorporated. Care instructions adapted under license by Reymundo Scott (which disclaims liability or warranty for this information). This care instruction is for use with your licensed healthcare professional. If you have questions about a medical condition or this instruction, always ask your healthcare professional. [address] any warranty or liability for your use of this information. Content Version: 9.3.68129; Last Revised: October 20, 2011               We hope that we have addressed all of your medical concerns. The examination and treatment you received in the Emergency Department were for an emergent problem and were not intended as complete care. It is important that you follow up with your healthcare provider(s) for ongoing care.  If your symptoms worsen or do not improve as expected, and you are unable to reach your usual health care provider(s), you should return to the Emergency Department. Today's healthcare is undergoing tremendous change, and patient satisfaction surveys are one of the many tools to assess the quality of medical care. You may receive a survey from the [employer] regarding your experience in the Emergency Department. I hope that your experience has been completely positive, particularly the medical care that I provided. As such, please participate in the survey; anything less than excellent does not meet my expectations or intentions. [facility] and 8 [facility] participate in nationally recognized quality of care measures. If your blood pressure is greater than 120/80, as reported below, we urge that you seek medical care to address the potential of high blood pressure, commonly known as hypertension. Hypertension can be hereditary or can be caused by certain medical conditions, pain, stress, or \"white coat syndrome. \"       Please make an appointment with your health care provider(s) for follow up of your Emergency Department visit. VITALS:   Patient Vitals for the past 8 hrs:   Temp Pulse Resp BP SpO2   04/13/17 1623 98.4 °F (36.9 °C) 66 16 117/86 99 %          Thank you for allowing us to provide you with medical care today. We realize that you have many choices for your emergency care needs. Please choose us in the future for any continued health care needs. Mich Mccullough, [address]  [place].   Office: [phone]            Recent Results (from the past 24 hour(s))   HCG URINE, QL. - POC    Collection Time: 04/13/17  4:58 PM   Result Value Ref Range    Pregnancy test,urine (POC) NEGATIVE  NEG     METABOLIC PANEL, COMPREHENSIVE    Collection Time: 04/13/17  5:11 PM   Result Value Ref Range    Sodium 138 136 - 145 mmol/L    Potassium 3.8 3.5 - 5.1 mmol/L    Chloride 106 97 - 108 mmol/L    CO2 26 21 - 32 mmol/L    Anion gap 6 5 - 15 mmol/L    Glucose 93 65 - 100 mg/dL    BUN 10 6 - 20 MG/DL    Creatinine 0.63 0.55 - 1.02 MG/DL    BUN/Creatinine ratio 16 12 - 20      GFR est AA >60 >60 ml/min/1.73m2    GFR est non-AA >60 >60 ml/min/1.73m2    Calcium 8.8 8.5 - 10.1 MG/DL    Bilirubin, total 0.4 0.2 - 1.0 MG/DL    ALT (SGPT) 20 12 - 78 U/L    AST (SGOT) 9 (L) 15 - 37 U/L    Alk. phosphatase 49 45 - 117 U/L    Protein, total 7.5 6.4 - 8.2 g/dL    Albumin 4.1 3.5 - 5.0 g/dL    Globulin 3.4 2.0 - 4.0 g/dL    A-G Ratio 1.2 1.1 - 2.2     CBC WITH AUTOMATED DIFF    Collection Time: 04/13/17  5:11 PM   Result Value Ref Range    WBC 7.7 3.6 - 11.0 K/uL    RBC 4.48 3.80 - 5.20 M/uL    HGB 13.6 11.5 - 16.0 g/dL    HCT 40.0 35.0 - 47.0 %    MCV 89.3 80.0 - 99.0 FL    MCH 30.4 26.0 - 34.0 PG    MCHC 34.0 30.0 - 36.5 g/dL    RDW 12.6 11.5 - 14.5 %    PLATELET 868 863 - 657 K/uL    NEUTROPHILS 66 32 - 75 %    LYMPHOCYTES 28 12 - 49 %    MONOCYTES 4 (L) 5 - 13 %    EOSINOPHILS 2 0 - 7 %    BASOPHILS 0 0 - 1 %    ABS. NEUTROPHILS 5.1 1.8 - 8.0 K/UL    ABS. LYMPHOCYTES 2.1 0.8 - 3.5 K/UL    ABS. MONOCYTES 0.3 0.0 - 1.0 K/UL    ABS. EOSINOPHILS 0.2 0.0 - 0.4 K/UL    ABS. BASOPHILS 0.0 0.0 - 0.1 K/UL       Mri Brain W Wo Cont    Result Date: 4/13/2017  EXAM:  MRI BRAIN W WO CONT INDICATION:    visual field cuts and blurred vision hx of elevated prolactin levels COMPARISON:  None CONTRAST:  6 mL Gadavist. TECHNIQUE:  MR imaging of the brain was performed with particular attention to the orbits including sagittal T1, axial T1, T2, FLAIR, GRE, DWI/ADC; coronal T2 with fat saturation;  multiplanar pre and post T1of the whole brain and orbits with and without fat saturation. FINDINGS: The optic nerves, extraocular muscles, and intraorbital fat are normal.  There is no abnormal intraorbital enhancement. The optic chiasm is normal.  There is no suprasellar mass. Pituitary is unremarkable.  Infundibulum is midline. The ventricles are midline without hydrocephalus. There is no acute intra or extra-axial fluid collection. There is no significant white matter disease. There is no acute infarction. The major intracranial vascular flow-voids are patent. Slight inflammatory change noted in the left maxillary sinus. IMPRESSION:  Unremarkable MRI of the brain and orbits. Ct Head Wo Cont    Result Date: 4/13/2017  EXAM:  CT HEAD WO CONT INDICATION:   visual field cuts and smoke rings. COMPARISON: None. TECHNIQUE: Unenhanced CT of the head was performed using 5 mm images. Brain and bone windows were generated. CT dose reduction was achieved through use of a standardized protocol tailored for this examination and automatic exposure control for dose modulation. FINDINGS: The ventricles and sulci are normal in size, shape and configuration and midline. There is no significant white matter disease. There is no intracranial hemorrhage, extra-axial collection, mass, mass effect or midline shift. The basilar cisterns are open. No acute infarct is identified. The bone windows demonstrate no abnormalities. The visualized portions of the paranasal sinuses and mastoid air cells are clear.      IMPRESSION: Normal CT scan of the head without contrast

## 2017-04-19 ENCOUNTER — OFFICE VISIT (OUTPATIENT)
Dept: NEUROLOGY | Age: 27
End: 2017-04-19

## 2017-04-19 VITALS
OXYGEN SATURATION: 98 % | WEIGHT: 136 LBS | DIASTOLIC BLOOD PRESSURE: 78 MMHG | SYSTOLIC BLOOD PRESSURE: 110 MMHG | HEART RATE: 65 BPM | RESPIRATION RATE: 18 BRPM

## 2017-04-19 RX ORDER — NORTRIPTYLINE HYDROCHLORIDE 25 MG/1
25 CAPSULE ORAL
Qty: 30 CAP | Refills: 1 | Status: SHIPPED | OUTPATIENT
Start: 2017-04-19 | End: 2018-04-04

## 2017-04-19 RX ORDER — BROMOCRIPTINE MESYLATE 2.5 MG/1
2.5 TABLET ORAL DAILY
COMMUNITY
End: 2017-08-19

## 2017-04-19 NOTE — MR AVS SNAPSHOT
Visit Information Date & Time Provider Department Dept. Phone Encounter #  
 4/19/2017 11:00 AM Arias Rdoriguez, [address] [facility] at [address] [id number] Follow-up Instructions Return in about 6 weeks (around 5/31/2017). Upcoming Health Maintenance Date Due  
 HPV AGE 9Y-34Y (1 of 3 - Female 3 Dose Series) 7/7/2003 DTaP/Tdap/Td series (1 - Tdap) 7/7/2013 PAP AKA CERVICAL CYTOLOGY 7/7/2013 INFLUENZA AGE 9 TO ADULT 8/1/2016 Allergies as of 4/19/2017  Review Complete On: 4/19/2017 By: Arias Rodriguez, DO No Known Allergies Current Immunizations  Never Reviewed No immunizations on file. Not reviewed this visit Vitals BP Pulse Resp Weight(growth percentile) SpO2 OB Status 110/78 65 18 136 lb (61.7 kg) 98% Having regular periods Smoking Status Never Smoker Vitals History Preferred Pharmacy Pharmacy Name Phone [facility] [address] [place] [address] [phone] Your Updated Medication List  
  
   
This list is accurate as of: 4/19/17 11:43 AM.  Always use your most recent med list. ALLEGRA PO Take  by mouth.  
  
 bromocriptine 2.5 mg tablet Commonly known as:  PARLODEL Take 2.5 mg by mouth daily. nortriptyline 25 mg capsule Commonly known as:  PAMELOR Take 1 Cap by mouth nightly. Prescriptions Sent to Pharmacy Refills  
 nortriptyline (PAMELOR) 25 mg capsule 1 Sig: Take 1 Cap by mouth nightly. Class: Normal  
 Pharmacy: [facility] [address] [place] [address] #: [phone] Route: Oral  
  
Follow-up Instructions Return in about 6 weeks (around 5/31/2017). Patient Instructions A Healthy Lifestyle: Care Instructions Your Care Instructions A healthy lifestyle can help you feel good, stay at a healthy weight, and have plenty of energy for both work and play. A healthy lifestyle is something you can share with your whole family. A healthy lifestyle also can lower your risk for serious health problems, such as high blood pressure, heart disease, and diabetes. You can follow a few steps listed below to improve your health and the health of your family. Follow-up care is a key part of your treatment and safety. Be sure to make and go to all appointments, and call your doctor if you are having problems. Its also a good idea to know your test results and keep a list of the medicines you take. How can you care for yourself at home? · Do not eat too much sugar, fat, or fast foods. You can still have dessert and treats now and then. The goal is moderation. · Start small to improve your eating habits. Pay attention to portion sizes, drink less juice and soda pop, and eat more fruits and vegetables. ¨ Eat a healthy amount of food. A 3-ounce serving of meat, for example, is about the size of a deck of cards. Fill the rest of your plate with vegetables and whole grains. ¨ Limit the amount of soda and sports drinks you have every day. Drink more water when you are thirsty. ¨ Eat at least 5 servings of fruits and vegetables every day. It may seem like a lot, but it is not hard to reach this goal. A serving or helping is 1 piece of fruit, 1 cup of vegetables, or 2 cups of leafy, raw vegetables. Have an apple or some carrot sticks as an afternoon snack instead of a candy bar. Try to have fruits and/or vegetables at every meal. 
· Make exercise part of your daily routine. You may want to start with simple activities, such as walking, bicycling, or slow swimming. Try to be active 30 to 60 minutes every day. You do not need to do all 30 to 60 minutes all at once. For example, you can exercise 3 times a day for 10 or 20 minutes.  Moderate exercise is safe for most people, but it is always a good idea to talk to your doctor before starting an exercise program. 
· Keep moving. [place] [place] the lawn, work in the garden, or [employer]. Take the stairs instead of the elevator at work. · If you smoke, quit. People who smoke have an increased risk for heart attack, stroke, cancer, and other lung illnesses. Quitting is hard, but there are ways to boost your chance of quitting tobacco for good. ¨ Use nicotine gum, patches, or lozenges. ¨ Ask your doctor about stop-smoking programs and medicines. ¨ Keep trying. In addition to reducing your risk of diseases in the future, you will notice some benefits soon after you stop using tobacco. If you have shortness of breath or asthma symptoms, they will likely get better within a few weeks after you quit. · Limit how much alcohol you drink. Moderate amounts of alcohol (up to 2 drinks a day for men, 1 drink a day for women) are okay. But drinking too much can lead to liver problems, high blood pressure, and other health problems. Family health If you have a family, there are many things you can do together to improve your health. · Eat meals together as a family as often as possible. · Eat healthy foods. This includes fruits, vegetables, lean meats and dairy, and whole grains. · Include your family in your fitness plan. Most people think of activities such as jogging or tennis as the way to fitness, but there are many ways you and your family can be more active. Anything that makes you breathe hard and gets your heart pumping is exercise. Here are some tips: 
¨ Walk to do errands or to take your child to school or the bus. ¨ Go for a family bike ride after dinner instead of watching TV. Where can you learn more? Go to http://jadyn-dannie.info/. Enter Z153 in the search box to learn more about \"A Healthy Lifestyle: Care Instructions. \" Current as of: July 26, 2016 Content Version: 11.2 © 9551-2155 Healthwise, Incorporated. Care instructions adapted under license by [employer] (which disclaims liability or warranty for this information). If you have questions about a medical condition or this instruction, always ask your healthcare professional. [address] any warranty or liability for your use of this information. Introducing [facility]! Laila Encinas introduces [employer] patient portal. Now you can access parts of your medical record, email your doctor's office, and request medication refills online. 1. In your internet browser, go to [email or web address] [email or web address] [email or web address] Memory 2. Click on the First Time User? Click Here link in the Sign In box. You will see the New Member Sign Up page. 3. Enter your [employer] Access Code exactly as it appears below. You will not need to use this code after youve completed the sign-up process. If you do not sign up before the expiration date, you must request a new code. · [employer] Access Code: [email or web address] Expires: 7/18/2017 11:08 AM 
 
4. Enter the last four digits of your Social Security Number (xxxx) and Date of Birth (mm/dd/yyyy) as indicated and click Submit. You will be taken to the next sign-up page. 5. Create a [employer] ID. This will be your [employer] login ID and cannot be changed, so think of one that is secure and easy to remember. 6. Create a [employer] password. You can change your password at any time. 7. Enter your Password Reset Question and Answer. This can be used at a later time if you forget your password. 8. Enter your e-mail address. You will receive e-mail notification when new information is available in [address] 9. Click Sign Up. You can now view and download portions of your medical record. 10. Click the Download Summary menu link to download a portable copy of your medical information.  
 
If you have questions, please visit the Frequently Asked Questions section of the [employer]. Remember, [email or web address] is NOT to be used for urgent needs. For medical emergencies, dial 911. Now available from your iPhone and Android! Please provide this summary of care documentation to your next provider. Your primary care clinician is listed as Fredy Mcknight. If you have any questions after today's visit, please call [phone].

## 2017-04-19 NOTE — PROGRESS NOTES
[facility]   NEW PATIENT EVALUATION/CONSULTATION       PATIENT NAME: Evangelist Alvarez    MRN: [id number]    REASON FOR CONSULTATION: Headaches    04/19/17      Previous records (physician notes, laboratory reports, and radiology reports) and imaging studies were reviewed and summarized. My recommendations will be communicated back to the patient's physician(s) via electronic medical record and/or by [address] mail. HISTORY OF PRESENT ILLNESS:  Evangelist Alvarez is a 27 y.o. right handed female h/o hyperprolactinemia w/o pituitary adenoma presenting for evaluation of headaches. Pt reports she has a history of severe headaches since age 27, previously dx with chronic migraines w/aura. She used to follow with Neurology at [facility] but cannot recall their name and no records available. This past Tuesday, she was experiencing loss of vision in her right eye with nausea, severe headache and dizziness prompting ED eval.  She had CTH and MRI Brain completed which were reportedly normal per pt. Location: Bi-temporal, always accompanied by neck pain/cervicalgia and stiffness  Character: pressure, occasionally stabbing pain  Intensity: On average 10/10  Frequency: 2-3x/weekly  # HA free days per month: <15  Duration: 24 hours-3 days  Aura: yes  Associated Sx with HA:  +nausea/vomiting, +phonophotophobia. Denies unilateral ptosis, conjunctival injection, lacrimation, sweating  Neurological ROS: Denies focal weakness, numbness. +vision loss associated with headaches, +dizziness +aphasia  Systemic ROS:   Caffeine use: 1 cup coffee daily  H/O Head trauma: none  Depressive or anxiety Sx: none    Any change in pattern of HA?  As above    Triggers: weather changes, sleep deprivation, stress  Alleviating factors: None  FHx HA/migraine: mother with migraines    Treatment so far:   Relpax-effective  Imitrex-ineffective  TPM-on this for 6 months and this was helpful    Investigations so far: MRI Brain recently performed and normal per pt       PAST MEDICAL HISTORY:  Past Medical History:   Diagnosis Date    Hyperprolactinemia ([address]     Neurological disorder        PAST SURGICAL HISTORY:  Past Surgical History:   Procedure Laterality Date    HX WISDOM TEETH EXTRACTION         FAMILY HISTORY:   Family History   Problem Relation Age of Onset    Headache Mother          SOCIAL HISTORY:  Social History     Social History    Marital status: UNKNOWN     Spouse name: N/A    Number of children: N/A    Years of education: N/A     Social History Main Topics    Smoking status: Never Smoker    Smokeless tobacco: None    Alcohol use No    Drug use: None    Sexual activity: Not Asked     Other Topics Concern    None     Social History Narrative    None         MEDICATIONS:   Current Outpatient Prescriptions   Medication Sig Dispense Refill    FEXOFENADINE HCL (ALLEGRA PO) Take  by mouth.  bromocriptine (PARLODEL) 2.5 mg tablet Take 2.5 mg by mouth daily. ALLERGIES:  No Known Allergies      REVIEW OF SYSTEMS:  10 point ROS reviewed with patient. Please see scanned document under media. PHYSICAL EXAM:  Vital Signs:   Visit Vitals    /78    Pulse 65    Resp 18    Wt 61.7 kg (136 lb)    SpO2 98%        General Medical Exam:  General:  Well appearing, comfortable, in no apparent distress. Eyes/ENT: see cranial nerve examination. Neck: No masses appreciated. Full range of motion without tenderness. Respiratory:  Clear to auscultation, good air entry bilaterally. Cardiac:  Regular rate and rhythm, no murmur. GI:  Soft, non-tender, non-distended abdomen. Bowel sounds normal. No masses, organomegaly. Extremities:  No deformities, edema, or skin discoloration. Skin:  No rashes or lesions. Neurological:  · Mental Status:  Alert and oriented to person, place, and time with fluent speech.    · Cranial Nerves:   CNII/III/IV/VI: Optic disc w/clear margins b/l, visual fields full to confrontation, EOMI, PERRL, no ptosis or nystagmus. CN V: Facial sensation intact bilaterally, masseter 5/5   CN VII: Facial muscles symmetric and strong   CN VIII: Hears finger rub well bilaterally, intact vestibular function   CN IX/X: Normal palatal movement   CN XI: Full strength shoulder shrug bilaterally   CN XII: Tongue protrusion full and midline without fasciculation or atrophy  · Motor: Normal tone and muscle bulk with no pronator drift. No atrophy or fasciculations present on examination. Individual muscle group testing:  Shoulder abduction:   Left:5/5   Right : 5/5    Shoulder adduction:   Left:5/5   Right : 5/5    Elbow flexion:      Left:5/5   Right : 5/5  Elbow extension:    Left:5/5   Right : 5/5   Wrist flexion:    Left:5/5   Right : 5/5  Wrist extension:    Left:5/5   Right : 5/5  Arm pronation:   Left:5/5   Right : 5/5  Arm supination:   Left:5/5   Right : 5/5    Finger flexion:    Left:5/5   Right : 5/5    Finger extension:   Left:5/5   Right : 5/5   Finger abduction:  Left:5/5   Right : 5/5   Finger adduction:   Left:5/5   Right : 5/5  Hip flexion:     Left:5/5   Right : 5/5         Hip extension:   Left:5/5   Right : 5/5    Knee flexion:    Left:5/5   Right : 5/5    Knee extension:   Left:5/5   Right : 5/5    Dorsiflexion:     Left:5/5   Right : 5/5  Plantar flexion:    Left:5/5   Right : 5/5      · MSRs: No crossed adductors or clonus. RIGHT  LEFT   Brachioradialis 1+ 1+   Biceps 1+ 1+   Triceps 1+ 1+   Knee 1+ 1+   Achilles 1+ 1+        Plantar response Downward Downward          · Sensation: Normal and symmetric perception of pinprick, temperature, light touch, proprioception, and vibration; (-) Romberg. · Coordination: No dysmetria. Normal rapid alternating movements; finger-to-nose and heel-to- shin testing are within normal limits. · Gait: Normal native and stress (tandem/heel/toe walking). ASSESSMENT:      ICD-10-CM ICD-9-CM    1.  Chronic migraine G43.709 29.70    27year old female with a h/o hyperprolactinemia presenting with chronic migraines w/aura, recent ocular migraines. Neuroimaging completed including MRI Brain during recent ED evaluation and negative for intracranial lesion/mass per pt. Neurological examination is presently non-focal.  Discussed trial of TCA therapy. Headache education  Discussed pathophysiology of headache. Discussed use of headache diary. Discussed treatment options, both abortive and preventive medications. Instructed patient about medications and potential side effects. Discussed medication overuse headache and to limit use of analgesics to less than 2 doses per week. PLAN:  · Start nortriptyline 25mg qhs for migraine ppx  · Sprix for abortive headache tx    Follow-up Disposition:  Return in about 6 weeks (around 5/31/2017). Mildred Restrepo,   Staff Neurologist  Diplomate, American Board of Psychiatry & Neurology       CC Referring provider:  Dyan Chairez

## 2017-05-05 ENCOUNTER — APPOINTMENT (OUTPATIENT)
Dept: GENERAL RADIOLOGY | Age: 27
End: 2017-05-05
Attending: STUDENT IN AN ORGANIZED HEALTH CARE EDUCATION/TRAINING PROGRAM
Payer: OTHER GOVERNMENT

## 2017-05-05 ENCOUNTER — HOSPITAL ENCOUNTER (EMERGENCY)
Age: 27
Discharge: HOME | End: 2017-05-06
Attending: STUDENT IN AN ORGANIZED HEALTH CARE EDUCATION/TRAINING PROGRAM | Admitting: STUDENT IN AN ORGANIZED HEALTH CARE EDUCATION/TRAINING PROGRAM
Payer: OTHER GOVERNMENT

## 2017-05-05 DIAGNOSIS — R07.81 PLEURITIC CHEST PAIN: Primary | ICD-10-CM

## 2017-05-05 LAB
ALBUMIN SERPL BCP-MCNC: 3.7 G/DL (ref 3.5–5)
ALBUMIN/GLOB SERPL: 1.2 {RATIO} (ref 1.1–2.2)
ALP SERPL-CCNC: 46 U/L (ref 45–117)
ALT SERPL-CCNC: 17 U/L (ref 12–78)
ANION GAP BLD CALC-SCNC: 7 MMOL/L (ref 5–15)
AST SERPL W P-5'-P-CCNC: 9 U/L (ref 15–37)
BASOPHILS # BLD AUTO: 0 K/UL (ref 0–0.1)
BASOPHILS # BLD: 0 % (ref 0–1)
BILIRUB SERPL-MCNC: 0.2 MG/DL (ref 0.2–1)
BUN SERPL-MCNC: 11 MG/DL (ref 6–20)
BUN/CREAT SERPL: 14 (ref 12–20)
CALCIUM SERPL-MCNC: 8.4 MG/DL (ref 8.5–10.1)
CHLORIDE SERPL-SCNC: 108 MMOL/L (ref 97–108)
CK SERPL-CCNC: 116 U/L (ref 26–192)
CO2 SERPL-SCNC: 27 MMOL/L (ref 21–32)
CREAT SERPL-MCNC: 0.79 MG/DL (ref 0.55–1.02)
EOSINOPHIL # BLD: 0.3 K/UL (ref 0–0.4)
EOSINOPHIL NFR BLD: 3 % (ref 0–7)
ERYTHROCYTE [DISTWIDTH] IN BLOOD BY AUTOMATED COUNT: 12.7 % (ref 11.5–14.5)
GLOBULIN SER CALC-MCNC: 3.1 G/DL (ref 2–4)
GLUCOSE SERPL-MCNC: 95 MG/DL (ref 65–100)
HCG UR QL: NEGATIVE
HCT VFR BLD AUTO: 36.2 % (ref 35–47)
HGB BLD-MCNC: 12.4 G/DL (ref 11.5–16)
LYMPHOCYTES # BLD AUTO: 32 % (ref 12–49)
LYMPHOCYTES # BLD: 3 K/UL (ref 0.8–3.5)
MCH RBC QN AUTO: 30.2 PG (ref 26–34)
MCHC RBC AUTO-ENTMCNC: 34.3 G/DL (ref 30–36.5)
MCV RBC AUTO: 88.3 FL (ref 80–99)
MONOCYTES # BLD: 0.8 K/UL (ref 0–1)
MONOCYTES NFR BLD AUTO: 8 % (ref 5–13)
NEUTS SEG # BLD: 5.4 K/UL (ref 1.8–8)
NEUTS SEG NFR BLD AUTO: 57 % (ref 32–75)
PLATELET # BLD AUTO: 218 K/UL (ref 150–400)
POTASSIUM SERPL-SCNC: 3.6 MMOL/L (ref 3.5–5.1)
PROT SERPL-MCNC: 6.8 G/DL (ref 6.4–8.2)
RBC # BLD AUTO: 4.1 M/UL (ref 3.8–5.2)
SODIUM SERPL-SCNC: 142 MMOL/L (ref 136–145)
TROPONIN I SERPL-MCNC: <0.04 NG/ML
WBC # BLD AUTO: 9.5 K/UL (ref 3.6–11)

## 2017-05-05 PROCEDURE — 93005 ELECTROCARDIOGRAM TRACING: CPT

## 2017-05-05 PROCEDURE — 80053 COMPREHEN METABOLIC PANEL: CPT | Performed by: STUDENT IN AN ORGANIZED HEALTH CARE EDUCATION/TRAINING PROGRAM

## 2017-05-05 PROCEDURE — 81025 URINE PREGNANCY TEST: CPT

## 2017-05-05 PROCEDURE — 74011250636 HC RX REV CODE- 250/636: Performed by: STUDENT IN AN ORGANIZED HEALTH CARE EDUCATION/TRAINING PROGRAM

## 2017-05-05 PROCEDURE — 84484 ASSAY OF TROPONIN QUANT: CPT | Performed by: STUDENT IN AN ORGANIZED HEALTH CARE EDUCATION/TRAINING PROGRAM

## 2017-05-05 PROCEDURE — 99285 EMERGENCY DEPT VISIT HI MDM: CPT

## 2017-05-05 PROCEDURE — 71020 XR CHEST PA LAT: CPT

## 2017-05-05 PROCEDURE — 96374 THER/PROPH/DIAG INJ IV PUSH: CPT

## 2017-05-05 PROCEDURE — 85025 COMPLETE CBC W/AUTO DIFF WBC: CPT | Performed by: STUDENT IN AN ORGANIZED HEALTH CARE EDUCATION/TRAINING PROGRAM

## 2017-05-05 PROCEDURE — 85379 FIBRIN DEGRADATION QUANT: CPT | Performed by: STUDENT IN AN ORGANIZED HEALTH CARE EDUCATION/TRAINING PROGRAM

## 2017-05-05 PROCEDURE — 82550 ASSAY OF CK (CPK): CPT | Performed by: STUDENT IN AN ORGANIZED HEALTH CARE EDUCATION/TRAINING PROGRAM

## 2017-05-05 PROCEDURE — 36415 COLL VENOUS BLD VENIPUNCTURE: CPT | Performed by: STUDENT IN AN ORGANIZED HEALTH CARE EDUCATION/TRAINING PROGRAM

## 2017-05-05 RX ORDER — KETOROLAC TROMETHAMINE 30 MG/ML
30 INJECTION, SOLUTION INTRAMUSCULAR; INTRAVENOUS
Status: COMPLETED | OUTPATIENT
Start: 2017-05-05 | End: 2017-05-05

## 2017-05-05 RX ADMIN — KETOROLAC TROMETHAMINE 30 MG: 30 INJECTION, SOLUTION INTRAMUSCULAR at 23:44

## 2017-05-05 NOTE — LETTER
[address] 
[facility] [address] 
[phone] Work/School Note Date: 5/5/2017 To Whom It May concern: 
 
Aleksandar Gonzalez was seen and treated today in the emergency room by the following provider(s): 
Attending Provider: Shyam Wright MD.   
 
No heavy lifting for 3 days. Sincerely, Aurea Kevin RN

## 2017-05-06 VITALS
HEIGHT: 63 IN | RESPIRATION RATE: 17 BRPM | WEIGHT: 137.6 LBS | DIASTOLIC BLOOD PRESSURE: 69 MMHG | SYSTOLIC BLOOD PRESSURE: 98 MMHG | OXYGEN SATURATION: 98 % | TEMPERATURE: 98.2 F | BODY MASS INDEX: 24.38 KG/M2 | HEART RATE: 90 BPM

## 2017-05-06 LAB
ATRIAL RATE: 102 BPM
CALCULATED P AXIS, ECG09: 18 DEGREES
CALCULATED R AXIS, ECG10: 20 DEGREES
D DIMER PPP FEU-MCNC: 0.23 MG/L FEU (ref 0–0.65)
DIAGNOSIS, 93000: NORMAL
P-R INTERVAL, ECG05: 144 MS
Q-T INTERVAL, ECG07: 360 MS
QRS DURATION, ECG06: 82 MS
QTC CALCULATION (BEZET), ECG08: 469 MS
VENTRICULAR RATE, ECG03: 102 BPM

## 2017-05-06 PROCEDURE — 74011250637 HC RX REV CODE- 250/637: Performed by: STUDENT IN AN ORGANIZED HEALTH CARE EDUCATION/TRAINING PROGRAM

## 2017-05-06 RX ORDER — OXYCODONE AND ACETAMINOPHEN 5; 325 MG/1; MG/1
1 TABLET ORAL
Qty: 10 TAB | Refills: 0 | Status: SHIPPED | OUTPATIENT
Start: 2017-05-06 | End: 2017-05-08

## 2017-05-06 RX ORDER — OXYCODONE AND ACETAMINOPHEN 5; 325 MG/1; MG/1
1 TABLET ORAL
Status: COMPLETED | OUTPATIENT
Start: 2017-05-06 | End: 2017-05-06

## 2017-05-06 RX ADMIN — OXYCODONE AND ACETAMINOPHEN 1 TABLET: 5; 325 TABLET ORAL at 00:46

## 2017-05-06 NOTE — ED NOTES
Bedside shift change report given to Graz[facility] [address] (oncoming nurse) by Mariana Griffith RN (offgoing nurse). Report included the following information SBAR, ED Summary, Intake/Output, MAR and Recent Results.

## 2017-05-06 NOTE — ED TRIAGE NOTES
Patient arrives to ED with c/o chest pain x 4 days, patient states pain is stabbing to her upper right chest, radiates 'all over my chest\", +nausea , dizziness, patient states she has been seen in this ED x 3 in the last 3 months for same.

## 2017-05-06 NOTE — ED PROVIDER NOTES
HPI Comments: 27 y.o. female with past medical history significant for Migraines who presents from home with chief complaint of chest pain. Pt reports 4 day hx of diffuse chest pain. She describe pain as initally dull pain but has gradually worsened with current symptoms of \"stabbing\" pain most significantly over right chest. Current pain 9/10. Pt notes hx of similar symtpoms x 3 months intermittently. She reports being evaluated in the past for it but has had a benign work up. Pt also c/o nausea and dizziness. Pt denies SOB, fever, chills, cough, congestion or vomiting. LMP 4/10/17. There are no other acute medical concerns at this time. Social hx: Non-smoker. Occasional EtOH use. No illicit drug use. PCP: Jules Hansen MD    Note written by Reno Vogt, as dictated by Jacque Zhu MD 11:45 PM    The history is provided by the patient. No  was used. Past Medical History:   Diagnosis Date    Hyperprolactinemia ([facility] [address]     Migraines     Neurological disorder        Past Surgical History:   Procedure Laterality Date    HX WISDOM TEETH EXTRACTION           Family History:   Problem Relation Age of Onset    Headache Mother        Social History     Social History    Marital status: UNKNOWN     Spouse name: N/A    Number of children: N/A    Years of education: N/A     Occupational History    Not on file. Social History Main Topics    Smoking status: Never Smoker    Smokeless tobacco: Not on file    Alcohol use No      Comment: Occ    Drug use: No    Sexual activity: Not on file     Other Topics Concern    Not on file     Social History Narrative    ** Merged History Encounter **              ALLERGIES: Other food and Imitrex [sumatriptan succinate]    Review of Systems   Constitutional: Negative for activity change, diaphoresis, fatigue and fever. HENT: Negative for congestion and sore throat.     Eyes: Negative for photophobia and visual disturbance. Respiratory: Negative for cough, chest tightness and shortness of breath. Cardiovascular: Positive for chest pain. Negative for palpitations and leg swelling. Gastrointestinal: Positive for nausea. Negative for abdominal pain, blood in stool, constipation, diarrhea and vomiting. Genitourinary: Negative for difficulty urinating, dysuria, flank pain, frequency and hematuria. Musculoskeletal: Negative for back pain. Neurological: Positive for dizziness. Negative for syncope, numbness and headaches. Vitals:    05/05/17 2236 05/06/17 0000   BP: 107/77 96/66   Pulse: 88 99   Resp: 16 18   Temp: 98.2 °F (36.8 °C)    SpO2: 97% 98%   Weight: 62.4 kg (137 lb 9.6 oz)    Height: 5' 3\" (1.6 m)             Physical Exam   Constitutional: She is oriented to person, place, and time. She appears well-developed and well-nourished. No distress. HENT:   Head: Normocephalic and atraumatic. Nose: Nose normal.   Mouth/Throat: Oropharynx is clear and moist. No oropharyngeal exudate. Eyes: Conjunctivae and EOM are normal. Right eye exhibits no discharge. Left eye exhibits no discharge. No scleral icterus. Neck: Normal range of motion. Neck supple. No JVD present. No tracheal deviation present. No thyromegaly present. Cardiovascular: Normal rate, regular rhythm, normal heart sounds and intact distal pulses. Exam reveals no gallop and no friction rub. No murmur heard. Pulmonary/Chest: Effort normal and breath sounds normal. No stridor. No respiratory distress. She has no wheezes. She has no rales. She exhibits tenderness (diffuse, on palpation). Abdominal: Bowel sounds are normal. She exhibits no distension and no mass. There is no tenderness. There is no rebound. Musculoskeletal: Normal range of motion. She exhibits no edema or tenderness. Lymphadenopathy:     She has no cervical adenopathy. Neurological: She is alert and oriented to person, place, and time. No cranial nerve deficit. Coordination normal.   Skin: Skin is warm and dry. No rash noted. She is not diaphoretic. No erythema. No pallor. Psychiatric: She has a normal mood and affect. Her behavior is normal. Judgment and thought content normal.    Note written by Reno Ruvalcaba, as dictated by Michelle Cat MD 12:08 AM    MDM  Number of Diagnoses or Management Options  Pleuritic chest pain:   Diagnosis management comments: Pleuritic chest pain, PNA, PE.  26 y/o female with likely pleuritic chest pain that is reproducible on palpation. Will eval with cbc, cmp, ua, d-dimer, cxr, ecg, ce. Will give pain meds and reasess. Amount and/or Complexity of Data Reviewed  Clinical lab tests: ordered and reviewed  Tests in the radiology section of CPT®: ordered and reviewed  Review and summarize past medical records: yes    Risk of Complications, Morbidity, and/or Mortality  Presenting problems: moderate  Diagnostic procedures: moderate  Management options: moderate    Patient Progress  Patient progress: stable    ED Course       Procedures     ED EKG interpretation:  Rhythm: sinus tachycardia; Rate (approx.): 102; Otherwise unremarkable EKG. Note written by Reno Ruvalcaba, as dictated by Michelle Cat MD 12:09 AM    PROGRESS NOTE:  12:09 AM  Reassessed: Pt states her pain is still the same.

## 2017-05-06 NOTE — ED NOTES
I have reviewed discharge instructions with the patient. The patient verbalized understanding. Time allotted for questions. VSS. Pt ambulatory out of unit with family member.

## 2017-05-06 NOTE — DISCHARGE INSTRUCTIONS
We hope that we have addressed all of your medical concerns. The examination and treatment you received in the Emergency Department were for an emergent problem and were not intended as complete care. It is important that you follow up with your healthcare provider(s) for ongoing care. If your symptoms worsen or do not improve as expected, and you are unable to reach your usual health care provider(s), you should return to the Emergency Department. Today's healthcare is undergoing tremendous change, and patient satisfaction surveys are one of the many tools to assess the quality of medical care. You may receive a survey from the [employer] regarding your experience in the Emergency Department. I hope that your experience has been completely positive, particularly the medical care that I provided. As such, please participate in the survey; anything less than excellent does not meet my expectations or intentions. [facility] and 8 [facility] participate in nationally recognized quality of care measures. If your blood pressure is greater than 120/80, as reported below, we urge that you seek medical care to address the potential of high blood pressure, commonly known as hypertension. Hypertension can be hereditary or can be caused by certain medical conditions, pain, stress, or \"white coat syndrome. \"       Please make an appointment with your health care provider(s) for follow up of your Emergency Department visit. VITALS:   Patient Vitals for the past 8 hrs:   Temp Pulse Resp BP SpO2   05/06/17 0030 - 90 17 98/69 98 %   05/06/17 0000 - 99 18 96/66 98 %   05/05/17 2236 98.2 °F (36.8 °C) 88 16 107/77 97 %          Thank you for allowing us to provide you with medical care today. We realize that you have many choices for your emergency care needs. Please choose us in the future for any continued health care needs.       Regards, Rufino Brewster Akshat Han, [address] [place], [place] [address]y [address].   Office: [phone]            Recent Results (from the past 24 hour(s))   EKG, 12 LEAD, INITIAL    Collection Time: 05/05/17 11:00 PM   Result Value Ref Range    Ventricular Rate 102 BPM    Atrial Rate 102 BPM    P-R Interval 144 ms    QRS Duration 82 ms    Q-T Interval 360 ms    QTC Calculation (Bezet) 469 ms    Calculated P Axis 18 degrees    Calculated R Axis 20 degrees    Diagnosis       Sinus tachycardia  When compared with ECG of 29-JAN-2017 19:56,  No significant change was found     CBC WITH AUTOMATED DIFF    Collection Time: 05/05/17 11:11 PM   Result Value Ref Range    WBC 9.5 3.6 - 11.0 K/uL    RBC 4.10 3.80 - 5.20 M/uL    HGB 12.4 11.5 - 16.0 g/dL    HCT 36.2 35.0 - 47.0 %    MCV 88.3 80.0 - 99.0 FL    MCH 30.2 26.0 - 34.0 PG    MCHC 34.3 30.0 - 36.5 g/dL    RDW 12.7 11.5 - 14.5 %    PLATELET 755 293 - 040 K/uL    NEUTROPHILS 57 32 - 75 %    LYMPHOCYTES 32 12 - 49 %    MONOCYTES 8 5 - 13 %    EOSINOPHILS 3 0 - 7 %    BASOPHILS 0 0 - 1 %    ABS. NEUTROPHILS 5.4 1.8 - 8.0 K/UL    ABS. LYMPHOCYTES 3.0 0.8 - 3.5 K/UL    ABS. MONOCYTES 0.8 0.0 - 1.0 K/UL    ABS. EOSINOPHILS 0.3 0.0 - 0.4 K/UL    ABS. BASOPHILS 0.0 0.0 - 0.1 K/UL   METABOLIC PANEL, COMPREHENSIVE    Collection Time: 05/05/17 11:11 PM   Result Value Ref Range    Sodium 142 136 - 145 mmol/L    Potassium 3.6 3.5 - 5.1 mmol/L    Chloride 108 97 - 108 mmol/L    CO2 27 21 - 32 mmol/L    Anion gap 7 5 - 15 mmol/L    Glucose 95 65 - 100 mg/dL    BUN 11 6 - 20 MG/DL    Creatinine 0.79 0.55 - 1.02 MG/DL    BUN/Creatinine ratio 14 12 - 20      GFR est AA >60 >60 ml/min/1.73m2    GFR est non-AA >60 >60 ml/min/1.73m2    Calcium 8.4 (L) 8.5 - 10.1 MG/DL    Bilirubin, total 0.2 0.2 - 1.0 MG/DL    ALT (SGPT) 17 12 - 78 U/L    AST (SGOT) 9 (L) 15 - 37 U/L    Alk.  phosphatase 46 45 - 117 U/L    Protein, total 6.8 6.4 - 8.2 g/dL    Albumin 3.7 3.5 - 5.0 g/dL    Globulin 3.1 2.0 - 4.0 g/dL A-G Ratio 1.2 1.1 - 2.2     TROPONIN I    Collection Time: 05/05/17 11:11 PM   Result Value Ref Range    Troponin-I, Qt. <0.04 <0.05 ng/mL   CK W/ REFLX CKMB    Collection Time: 05/05/17 11:11 PM   Result Value Ref Range     26 - 192 U/L   HCG URINE, QL. - POC    Collection Time: 05/05/17 11:15 PM   Result Value Ref Range    Pregnancy test,urine (POC) NEGATIVE  NEG         Xr Chest Pa Lat    Result Date: 5/5/2017  INDICATION: chest pain stabbing upper right chest for 4 days EXAM: CXR 2 View FINDINGS: Frontal and lateral views of the chest show clear lungs. Heart size is normal. There is no pulmonary edema. There is no evident pneumothorax, adenopathy or effusion. IMPRESSION: Normal two view chest x-ray. Chest Pain: Care Instructions  Your Care Instructions  There are many things that can cause chest pain. Some are not serious and will get better on their own in a few days. But some kinds of chest pain need more testing and treatment. Your doctor may have recommended a follow-up visit in the next 8 to 12 hours. If you are not getting better, you may need more tests or treatment. Even though your doctor has released you, you still need to watch for any problems. The doctor carefully checked you, but sometimes problems can develop later. If you have new symptoms or if your symptoms do not get better, get medical care right away. If you have worse or different chest pain or pressure that lasts more than 5 minutes or you passed out (lost consciousness), call 911 or seek other emergency help right away. A medical visit is only one step in your treatment. Even if you feel better, you still need to do what your doctor recommends, such as going to all suggested follow-up appointments and taking medicines exactly as directed. This will help you recover and help prevent future problems. How can you care for yourself at home? · Rest until you feel better.   · Take your medicine exactly as prescribed. Call your doctor if you think you are having a problem with your medicine. · Do not drive after taking a prescription pain medicine. When should you call for help? Call 911 if:  · You passed out (lost consciousness). · You have severe difficulty breathing. · You have symptoms of a heart attack. These may include:  ¨ Chest pain or pressure, or a strange feeling in your chest.  ¨ Sweating. ¨ Shortness of breath. ¨ Nausea or vomiting. ¨ Pain, pressure, or a strange feeling in your back, neck, jaw, or upper belly or in one or both shoulders or arms. ¨ Lightheadedness or sudden weakness. ¨ A fast or irregular heartbeat. After you call 911, the  may tell you to chew 1 adult-strength or 2 to 4 low-dose aspirin. Wait for an ambulance. Do not try to drive yourself. Call your doctor today if:  · You have any trouble breathing. · Your chest pain gets worse. · You are dizzy or lightheaded, or you feel like you may faint. · You are not getting better as expected. · You are having new or different chest pain. Where can you learn more? Go to http://jadyn-dannie.info/. Enter A120 in the search box to learn more about \"Chest Pain: Care Instructions. \"  Current as of: May 27, 2016  Content Version: 11.2  © 2258-5672 [employer]. Care instructions adapted under license by [employer] (which disclaims liability or warranty for this information). If you have questions about a medical condition or this instruction, always ask your healthcare professional. [place] [address] any warranty or liability for your use of this information.

## 2017-05-28 ENCOUNTER — HOSPITAL ENCOUNTER (EMERGENCY)
Age: 27
Discharge: HOME | End: 2017-05-29
Attending: EMERGENCY MEDICINE
Payer: OTHER GOVERNMENT

## 2017-05-28 ENCOUNTER — APPOINTMENT (OUTPATIENT)
Dept: GENERAL RADIOLOGY | Age: 27
End: 2017-05-28
Attending: EMERGENCY MEDICINE
Payer: OTHER GOVERNMENT

## 2017-05-28 VITALS
HEIGHT: 63 IN | RESPIRATION RATE: 16 BRPM | BODY MASS INDEX: 24.27 KG/M2 | SYSTOLIC BLOOD PRESSURE: 115 MMHG | WEIGHT: 137 LBS | DIASTOLIC BLOOD PRESSURE: 79 MMHG | OXYGEN SATURATION: 100 % | TEMPERATURE: 97.5 F | HEART RATE: 73 BPM

## 2017-05-28 DIAGNOSIS — K92.0 HEMATEMESIS WITH NAUSEA: Primary | ICD-10-CM

## 2017-05-28 LAB
ALBUMIN SERPL BCP-MCNC: 4 G/DL (ref 3.5–5)
ALBUMIN/GLOB SERPL: 1.4 {RATIO} (ref 1.1–2.2)
ALP SERPL-CCNC: 43 U/L (ref 45–117)
ALT SERPL-CCNC: 23 U/L (ref 12–78)
ANION GAP BLD CALC-SCNC: 7 MMOL/L (ref 5–15)
APTT PPP: 25.6 SEC (ref 22.1–32.5)
AST SERPL W P-5'-P-CCNC: 11 U/L (ref 15–37)
BASOPHILS # BLD AUTO: 0 K/UL (ref 0–0.1)
BASOPHILS # BLD: 0 % (ref 0–1)
BILIRUB SERPL-MCNC: 0.3 MG/DL (ref 0.2–1)
BUN SERPL-MCNC: 12 MG/DL (ref 6–20)
BUN/CREAT SERPL: 18 (ref 12–20)
CALCIUM SERPL-MCNC: 8.8 MG/DL (ref 8.5–10.1)
CHLORIDE SERPL-SCNC: 104 MMOL/L (ref 97–108)
CO2 SERPL-SCNC: 26 MMOL/L (ref 21–32)
CREAT SERPL-MCNC: 0.65 MG/DL (ref 0.55–1.02)
EOSINOPHIL # BLD: 0.2 K/UL (ref 0–0.4)
EOSINOPHIL NFR BLD: 3 % (ref 0–7)
ERYTHROCYTE [DISTWIDTH] IN BLOOD BY AUTOMATED COUNT: 12.6 % (ref 11.5–14.5)
GLOBULIN SER CALC-MCNC: 2.8 G/DL (ref 2–4)
GLUCOSE SERPL-MCNC: 91 MG/DL (ref 65–100)
HCG UR QL: NEGATIVE
HCT VFR BLD AUTO: 37.5 % (ref 35–47)
HGB BLD-MCNC: 13 G/DL (ref 11.5–16)
INR PPP: 1.1 (ref 0.9–1.1)
LIPASE SERPL-CCNC: 272 U/L (ref 73–393)
LYMPHOCYTES # BLD AUTO: 20 % (ref 12–49)
LYMPHOCYTES # BLD: 1.7 K/UL (ref 0.8–3.5)
MCH RBC QN AUTO: 30.6 PG (ref 26–34)
MCHC RBC AUTO-ENTMCNC: 34.7 G/DL (ref 30–36.5)
MCV RBC AUTO: 88.2 FL (ref 80–99)
MONOCYTES # BLD: 0.5 K/UL (ref 0–1)
MONOCYTES NFR BLD AUTO: 6 % (ref 5–13)
NEUTS SEG # BLD: 6.3 K/UL (ref 1.8–8)
NEUTS SEG NFR BLD AUTO: 71 % (ref 32–75)
PLATELET # BLD AUTO: 194 K/UL (ref 150–400)
POTASSIUM SERPL-SCNC: 3.7 MMOL/L (ref 3.5–5.1)
PROT SERPL-MCNC: 6.8 G/DL (ref 6.4–8.2)
PROTHROMBIN TIME: 11 SEC (ref 9–11.1)
RBC # BLD AUTO: 4.25 M/UL (ref 3.8–5.2)
SODIUM SERPL-SCNC: 137 MMOL/L (ref 136–145)
THERAPEUTIC RANGE,PTTT: NORMAL SECS (ref 58–77)
WBC # BLD AUTO: 8.8 K/UL (ref 3.6–11)

## 2017-05-28 PROCEDURE — 85025 COMPLETE CBC W/AUTO DIFF WBC: CPT | Performed by: EMERGENCY MEDICINE

## 2017-05-28 PROCEDURE — 99283 EMERGENCY DEPT VISIT LOW MDM: CPT

## 2017-05-28 PROCEDURE — 74011250636 HC RX REV CODE- 250/636: Performed by: EMERGENCY MEDICINE

## 2017-05-28 PROCEDURE — 80053 COMPREHEN METABOLIC PANEL: CPT | Performed by: EMERGENCY MEDICINE

## 2017-05-28 PROCEDURE — 36415 COLL VENOUS BLD VENIPUNCTURE: CPT | Performed by: EMERGENCY MEDICINE

## 2017-05-28 PROCEDURE — C9113 INJ PANTOPRAZOLE SODIUM, VIA: HCPCS | Performed by: EMERGENCY MEDICINE

## 2017-05-28 PROCEDURE — 96374 THER/PROPH/DIAG INJ IV PUSH: CPT

## 2017-05-28 PROCEDURE — 81025 URINE PREGNANCY TEST: CPT

## 2017-05-28 PROCEDURE — 83690 ASSAY OF LIPASE: CPT | Performed by: EMERGENCY MEDICINE

## 2017-05-28 PROCEDURE — 74022 RADEX COMPL AQT ABD SERIES: CPT

## 2017-05-28 PROCEDURE — 85730 THROMBOPLASTIN TIME PARTIAL: CPT | Performed by: EMERGENCY MEDICINE

## 2017-05-28 PROCEDURE — 85610 PROTHROMBIN TIME: CPT | Performed by: EMERGENCY MEDICINE

## 2017-05-28 RX ORDER — PANTOPRAZOLE SODIUM 40 MG/10ML
40 INJECTION, POWDER, LYOPHILIZED, FOR SOLUTION INTRAVENOUS
Status: COMPLETED | OUTPATIENT
Start: 2017-05-28 | End: 2017-05-28

## 2017-05-28 RX ADMIN — PANTOPRAZOLE SODIUM 40 MG: 40 INJECTION, POWDER, FOR SOLUTION INTRAVENOUS at 21:36

## 2017-05-29 LAB — HGB BLD-MCNC: 12.2 G/DL (ref 11.5–16)

## 2017-05-29 PROCEDURE — 85018 HEMOGLOBIN: CPT | Performed by: EMERGENCY MEDICINE

## 2017-05-29 PROCEDURE — 36415 COLL VENOUS BLD VENIPUNCTURE: CPT | Performed by: EMERGENCY MEDICINE

## 2017-05-29 RX ORDER — OMEPRAZOLE 20 MG/1
20 CAPSULE, DELAYED RELEASE ORAL DAILY
Qty: 20 CAP | Refills: 0 | Status: SHIPPED | OUTPATIENT
Start: 2017-05-29 | End: 2017-12-18

## 2017-05-29 RX ORDER — SUCRALFATE 1 G/1
1 TABLET ORAL 4 TIMES DAILY
Qty: 30 TAB | Refills: 0 | Status: SHIPPED | OUTPATIENT
Start: 2017-05-29 | End: 2017-12-18

## 2017-05-29 NOTE — ED PROVIDER NOTES
HPI Comments: 27 y.o. female with past medical history significant for hyperprolactinemia who presents with chief complaint of hematemesis. Patient complains of seeing bright red blood streaks in her emesis starting today. Patient states she had been vomiting for 2 days prior to today when she started approx 1 hour ago. Patient states \"it's not more than 2 oz of blood. \" Patient also complains of dizziness. Patient staets she's had chest pain for approx 3 months and is still unsure why. Patient denies chance of pregnancy. Patient states she is not currently on any medications. Patient does not have a GI. Patient denies h/o abdominal surgeries. Patient denies other complaints. There are no other acute medical concerns at this time. Social hx: never smoker, occasional alcohol drinker  PCP: Nikos Bryan MD    Note written by Reno Boyle, as dictated by Svitlana Marin MD 9:29 PM     The history is provided by the patient. No  was used. Past Medical History:   Diagnosis Date    [facility]prolactinemia ([facility] [address]     Migraines     Neurological disorder        Past Surgical History:   Procedure Laterality Date    HX WISDOM TEETH EXTRACTION           Family History:   Problem Relation Age of Onset    Headache Mother        Social History     Social History    Marital status: UNKNOWN     Spouse name: N/A    Number of children: N/A    Years of education: N/A     Occupational History    Not on file. Social History Main Topics    Smoking status: Never Smoker    Smokeless tobacco: Not on file    Alcohol use No      Comment: Occ    Drug use: No    Sexual activity: Not on file     Other Topics Concern    Not on file     Social History Narrative    ** Merged History Encounter **              ALLERGIES: Other food and Imitrex [sumatriptan succinate]    Review of Systems   Constitutional: Negative for appetite change, chills, fatigue and fever.    HENT: Negative for congestion, rhinorrhea and sore throat. Respiratory: Negative for cough and shortness of breath. Cardiovascular: Positive for chest pain. Negative for leg swelling. Gastrointestinal: Positive for nausea and vomiting. Negative for abdominal pain and constipation. Genitourinary: Negative for difficulty urinating and dysuria. Musculoskeletal: Negative for back pain and neck pain. Skin: Negative for rash and wound. Neurological: Positive for dizziness. Negative for headaches. All other systems reviewed and are negative.       Vitals:    05/28/17 2042   BP: 115/79   Pulse: 73   Resp: 16   Temp: 97.5 °F (36.4 °C)   SpO2: 100%   Weight: 62.1 kg (137 lb)   Height: 5' 3\" (1.6 m)            Physical Exam   Physical Examination: General appearance - alert, well appearing, and in no distress, oriented to person, place, and time and normal appearing weight  Eyes - pupils equal and reactive, extraocular eye movements intact  Neck - supple, no significant adenopathy  Chest - clear to auscultation, no wheezes, rales or rhonchi, symmetric air entry  Heart - normal rate, regular rhythm, normal S1, S2, no murmurs, rubs, clicks or gallops  Abdomen - soft, nontender, nondistended, no masses or organomegaly  Back exam - full range of motion, no tenderness, palpable spasm or pain on motion  Neurological - alert, oriented, normal speech, no focal findings or movement disorder noted  Musculoskeletal - no joint tenderness, deformity or swelling  Extremities - peripheral pulses normal, no pedal edema, no clubbing or cyanosis  Skin - normal coloration and turgor, no rashes, no suspicious skin lesions noted  MDM  Number of Diagnoses or Management Options  Hematemesis with nausea:      Amount and/or Complexity of Data Reviewed  Clinical lab tests: ordered and reviewed  Decide to obtain previous medical records or to obtain history from someone other than the patient: yes  Review and summarize past medical records: yes    Patient Progress  Patient progress: stable    ED Course       Procedures    No vomiting in ED. VSS. Hemoglobin stable. Will d/c with GI f/u. May have meri ocampo tear or gastritis. prilosec and carafate. Return to ED for worsening symptoms.

## 2017-05-29 NOTE — DISCHARGE INSTRUCTIONS
Nausea and Vomiting: Care Instructions  Your Care Instructions    When you are nauseated, you may feel weak and sweaty and notice a lot of saliva in your mouth. Nausea often leads to vomiting. Most of the time you do not need to worry about nausea and vomiting, but they can be signs of other illnesses. Two common causes of nausea and vomiting are stomach flu and food poisoning. Nausea and vomiting from viral stomach flu will usually start to improve within 24 hours. Nausea and vomiting from food poisoning may last from 12 to 48 hours. The doctor has checked you carefully, but problems can develop later. If you notice any problems or new symptoms, get medical treatment right away. Follow-up care is a key part of your treatment and safety. Be sure to make and go to all appointments, and call your doctor if you are having problems. It's also a good idea to know your test results and keep a list of the medicines you take. How can you care for yourself at home? · To prevent dehydration, drink plenty of fluids, enough so that your urine is light yellow or clear like water. Choose water and other caffeine-free clear liquids until you feel better. If you have kidney, heart, or liver disease and have to limit fluids, talk with your doctor before you increase the amount of fluids you drink. · Rest in bed until you feel better. · When you are able to eat, try clear soups, mild foods, and liquids until all symptoms are gone for 12 to 48 hours. Other good choices include dry toast, crackers, cooked cereal, and gelatin dessert, such as Jell-O. When should you call for help? Call 911 anytime you think you may need emergency care. For example, call if:  · You passed out (lost consciousness). Call your doctor now or seek immediate medical care if:  · You have symptoms of dehydration, such as:  ¨ Dry eyes and a dry mouth. ¨ Passing only a little dark urine.   ¨ Feeling thirstier than usual.  · You have new or worsening belly pain. · You have a new or higher fever. · You vomit blood or what looks like coffee grounds. Watch closely for changes in your health, and be sure to contact your doctor if:  · You have ongoing nausea and vomiting. · Your vomiting is getting worse. · Your vomiting lasts longer than 2 days. · You are not getting better as expected. Where can you learn more? Go to http://jadyn-dannie.info/. Enter 25 [id number] in the search box to learn more about \"Nausea and Vomiting: Care Instructions. \"  Current as of: May 27, 2016  Content Version: 11.2  © 6193-6542 [employer]. Care instructions adapted under license by [employer] (which disclaims liability or warranty for this information). If you have questions about a medical condition or this instruction, always ask your healthcare professional. [address] any warranty or liability for your use of this information. Upper Gastrointestinal Bleeding: Care Instructions  Your Care Instructions    The digestive or gastrointestinal tract goes from the mouth to the anus. It is often called the GI tract. Bleeding in the upper GI tract can happen anywhere from the esophagus to the first part of the small intestine. Sometimes it's caused by an ulcer in your stomach. Or it may be caused by blood vessels in your esophagus. Your esophagus is the tube that carries food from your throat to your stomach. Light bleeding may not cause any symptoms at first. But if you continue to bleed for a while, you may feel very weak or tired. Sudden, heavy bleeding means you need to see a doctor right away. This kind of bleeding can be very dangerous. But it can usually be cured or controlled. The doctor may do some tests to find the cause of your bleeding. Follow-up care is a key part of your treatment and safety. Be sure to make and go to all appointments, and call your doctor if you are having problems.  It's also a good idea to know your test results and keep a list of the medicines you take. How can you care for yourself at home? · Be safe with medicines. Take your medicines exactly as prescribed. Call your doctor if you think you are having a problem with your medicine. You will get more details on the specific medicines your doctor prescribes. · Do not take aspirin or other anti-inflammatory medicines, such as naproxen (Aleve) or ibuprofen (Advil, Motrin), without talking to your doctor first. Ask your doctor if it is okay to use acetaminophen (Tylenol). · Do not drink alcohol. · The bleeding may make you lose iron. So it's important to eat foods that have a lot of iron. These include red meat, shellfish, poultry, and eggs. They also include beans, raisins, whole-grain breads, and leafy green vegetables. If you want help planning meals, you can meet with a dietitian. When should you call for help? Call 911 anytime you think you may need emergency care. For example, call if:  · You have sudden, severe belly pain. · You vomit blood or what looks like coffee grounds. · You passed out (lost consciousness). · Your stools are maroon or very bloody. Call your doctor now or seek immediate medical care if:  · You are dizzy or lightheaded, or you feel like you may faint. · Your stools are black and look like tar. · You have belly pain. · You vomit or have nausea. · You have trouble swallowing, or it hurts when you swallow. Watch closely for changes in your health, and be sure to contact your doctor if you do not get better as expected. Where can you learn more? Go to http://jadyn-dannie.info/. Enter Q424 in the search box to learn more about \"Upper Gastrointestinal Bleeding: Care Instructions. \"  Current as of: May 27, 2016  Content Version: 11.2  © 3142-7648 [employer].  Care instructions adapted under license by [employer] (which disclaims liability or warranty for this information). If you have questions about a medical condition or this instruction, always ask your healthcare professional. [address] any warranty or liability for your use of this information. We hope that we have addressed all of your medical concerns. The examination and treatment you received in the Emergency Department were for an emergent problem and were not intended as complete care. It is important that you follow up with your healthcare provider(s) for ongoing care. If your symptoms worsen or do not improve as expected, and you are unable to reach your usual health care provider(s), you should return to the Emergency Department. Today's healthcare is undergoing tremendous change, and patient satisfaction surveys are one of the many tools to assess the quality of medical care. You may receive a survey from the [employer] regarding your experience in the Emergency Department. I hope that your experience has been completely positive, particularly the medical care that I provided. As such, please participate in the survey; anything less than excellent does not meet my expectations or intentions. [facility] and [employer] participate in nationally recognized quality of care measures. If your blood pressure is greater than 120/80, as reported below, we urge that you seek medical care to address the potential of high blood pressure, commonly known as hypertension. Hypertension can be hereditary or can be caused by certain medical conditions, pain, stress, or \"white coat syndrome. \"       Please make an appointment with your health care provider(s) for follow up of your Emergency Department visit. VITALS:   Patient Vitals for the past 8 hrs:   Temp Pulse Resp BP SpO2   05/28/17 2042 97.5 °F (36.4 °C) 73 16 115/79 100 %          Thank you for allowing us to provide you with medical care today.   We realize that you have many choices for your emergency care needs. Please choose us in the future for any continued health care needs. Amaury Thompson, [address].   Office: [phone]            Recent Results (from the past 24 hour(s))   CBC WITH AUTOMATED DIFF    Collection Time: 05/28/17  9:24 PM   Result Value Ref Range    WBC 8.8 3.6 - 11.0 K/uL    RBC 4.25 3.80 - 5.20 M/uL    HGB 13.0 11.5 - 16.0 g/dL    HCT 37.5 35.0 - 47.0 %    MCV 88.2 80.0 - 99.0 FL    MCH 30.6 26.0 - 34.0 PG    MCHC 34.7 30.0 - 36.5 g/dL    RDW 12.6 11.5 - 14.5 %    PLATELET 808 661 - 155 K/uL    NEUTROPHILS 71 32 - 75 %    LYMPHOCYTES 20 12 - 49 %    MONOCYTES 6 5 - 13 %    EOSINOPHILS 3 0 - 7 %    BASOPHILS 0 0 - 1 %    ABS. NEUTROPHILS 6.3 1.8 - 8.0 K/UL    ABS. LYMPHOCYTES 1.7 0.8 - 3.5 K/UL    ABS. MONOCYTES 0.5 0.0 - 1.0 K/UL    ABS. EOSINOPHILS 0.2 0.0 - 0.4 K/UL    ABS. BASOPHILS 0.0 0.0 - 0.1 K/UL   METABOLIC PANEL, COMPREHENSIVE    Collection Time: 05/28/17  9:24 PM   Result Value Ref Range    Sodium 137 136 - 145 mmol/L    Potassium 3.7 3.5 - 5.1 mmol/L    Chloride 104 97 - 108 mmol/L    CO2 26 21 - 32 mmol/L    Anion gap 7 5 - 15 mmol/L    Glucose 91 65 - 100 mg/dL    BUN 12 6 - 20 MG/DL    Creatinine 0.65 0.55 - 1.02 MG/DL    BUN/Creatinine ratio 18 12 - 20      GFR est AA >60 >60 ml/min/1.73m2    GFR est non-AA >60 >60 ml/min/1.73m2    Calcium 8.8 8.5 - 10.1 MG/DL    Bilirubin, total 0.3 0.2 - 1.0 MG/DL    ALT (SGPT) 23 12 - 78 U/L    AST (SGOT) 11 (L) 15 - 37 U/L    Alk.  phosphatase 43 (L) 45 - 117 U/L    Protein, total 6.8 6.4 - 8.2 g/dL    Albumin 4.0 3.5 - 5.0 g/dL    Globulin 2.8 2.0 - 4.0 g/dL    A-G Ratio 1.4 1.1 - 2.2     PTT    Collection Time: 05/28/17  9:24 PM   Result Value Ref Range    aPTT 25.6 22.1 - 32.5 sec    aPTT, therapeutic range     58.0 - 77.0 SECS   PROTHROMBIN TIME + INR    Collection Time: 05/28/17  9:24 PM   Result Value Ref Range    INR 1.1 0.9 - 1.1      Prothrombin time 11.0 9.0 - 11.1 sec   LIPASE    Collection Time: 05/28/17  9:24 PM   Result Value Ref Range    Lipase 272 73 - 393 U/L   HCG URINE, QL. - POC    Collection Time: 05/28/17  9:38 PM   Result Value Ref Range    Pregnancy test,urine (POC) NEGATIVE  NEG         Xr Abd Acute W 1 V Chest    Result Date: 5/28/2017  EXAM:  XR ABD ACUTE W 1 V CHEST INDICATION:  Abdominal Pain and hematemesis. Patient complains of seeing bright red blood streaks in her emesis starting today. Patient states she had been vomiting for 2 days prior to today when she started approx 1 hour ago. COMPARISON: Chest x-ray 5/5/2017. FINDINGS: The upright chest radiograph demonstrates clear lungs and normal cardiac and mediastinal contours. There is no pleural effusion or free air under the diaphragm. Supine and upright views of the abdomen demonstrate a nonobstructive bowel gas pattern. There is no free intraperitoneal air. No soft tissue masses or pathologic calcifications are identified. The bones are within normal limits.      IMPRESSION: Normal.

## 2017-05-29 NOTE — ED TRIAGE NOTES
Triage note: Patient arriving c/o blood in vomit; started vomiting 3 days ago, however now reports bright red blood in vomit. Reports associated SOB, dizziness and CP. CP has been occurring for 3 months.

## 2017-07-15 ENCOUNTER — APPOINTMENT (OUTPATIENT)
Dept: GENERAL RADIOLOGY | Age: 27
End: 2017-07-15
Attending: PHYSICIAN ASSISTANT
Payer: OTHER GOVERNMENT

## 2017-07-15 ENCOUNTER — HOSPITAL ENCOUNTER (EMERGENCY)
Age: 27
Discharge: HOME | End: 2017-07-16
Attending: EMERGENCY MEDICINE
Payer: OTHER GOVERNMENT

## 2017-07-15 DIAGNOSIS — N12 PYELONEPHRITIS: Primary | ICD-10-CM

## 2017-07-15 LAB
ALBUMIN SERPL BCP-MCNC: 4 G/DL (ref 3.5–5)
ALBUMIN/GLOB SERPL: 1.2 {RATIO} (ref 1.1–2.2)
ALP SERPL-CCNC: 44 U/L (ref 45–117)
ALT SERPL-CCNC: 23 U/L (ref 12–78)
ANION GAP BLD CALC-SCNC: 5 MMOL/L (ref 5–15)
APPEARANCE UR: ABNORMAL
AST SERPL W P-5'-P-CCNC: 14 U/L (ref 15–37)
BACTERIA URNS QL MICRO: ABNORMAL /HPF
BASOPHILS # BLD AUTO: 0 K/UL (ref 0–0.1)
BASOPHILS # BLD: 1 % (ref 0–1)
BILIRUB SERPL-MCNC: 0.2 MG/DL (ref 0.2–1)
BILIRUB UR QL: NEGATIVE
BUN SERPL-MCNC: 14 MG/DL (ref 6–20)
BUN/CREAT SERPL: 16 (ref 12–20)
CALCIUM SERPL-MCNC: 8.4 MG/DL (ref 8.5–10.1)
CHLORIDE SERPL-SCNC: 104 MMOL/L (ref 97–108)
CO2 SERPL-SCNC: 28 MMOL/L (ref 21–32)
COLOR UR: ABNORMAL
CREAT SERPL-MCNC: 0.9 MG/DL (ref 0.55–1.02)
D DIMER PPP FEU-MCNC: <0.17 MG/L FEU (ref 0–0.65)
EOSINOPHIL # BLD: 0.2 K/UL (ref 0–0.4)
EOSINOPHIL NFR BLD: 3 % (ref 0–7)
EPITH CASTS URNS QL MICRO: ABNORMAL /LPF
ERYTHROCYTE [DISTWIDTH] IN BLOOD BY AUTOMATED COUNT: 12.8 % (ref 11.5–14.5)
GLOBULIN SER CALC-MCNC: 3.3 G/DL (ref 2–4)
GLUCOSE SERPL-MCNC: 84 MG/DL (ref 65–100)
GLUCOSE UR STRIP.AUTO-MCNC: NEGATIVE MG/DL
HCG UR QL: NEGATIVE
HCT VFR BLD AUTO: 39.4 % (ref 35–47)
HGB BLD-MCNC: 13.3 G/DL (ref 11.5–16)
HGB UR QL STRIP: NEGATIVE
HYALINE CASTS URNS QL MICRO: ABNORMAL /LPF (ref 0–5)
KETONES UR QL STRIP.AUTO: NEGATIVE MG/DL
LEUKOCYTE ESTERASE UR QL STRIP.AUTO: ABNORMAL
LYMPHOCYTES # BLD AUTO: 35 % (ref 12–49)
LYMPHOCYTES # BLD: 2.7 K/UL (ref 0.8–3.5)
MCH RBC QN AUTO: 30.4 PG (ref 26–34)
MCHC RBC AUTO-ENTMCNC: 33.8 G/DL (ref 30–36.5)
MCV RBC AUTO: 90 FL (ref 80–99)
MONOCYTES # BLD: 0.6 K/UL (ref 0–1)
MONOCYTES NFR BLD AUTO: 8 % (ref 5–13)
NEUTS SEG # BLD: 4.1 K/UL (ref 1.8–8)
NEUTS SEG NFR BLD AUTO: 53 % (ref 32–75)
NITRITE UR QL STRIP.AUTO: POSITIVE
PH UR STRIP: 6.5 [PH] (ref 5–8)
PLATELET # BLD AUTO: 247 K/UL (ref 150–400)
POTASSIUM SERPL-SCNC: 3.9 MMOL/L (ref 3.5–5.1)
PROT SERPL-MCNC: 7.3 G/DL (ref 6.4–8.2)
PROT UR STRIP-MCNC: NEGATIVE MG/DL
RBC # BLD AUTO: 4.38 M/UL (ref 3.8–5.2)
RBC #/AREA URNS HPF: ABNORMAL /HPF (ref 0–5)
SODIUM SERPL-SCNC: 137 MMOL/L (ref 136–145)
SP GR UR REFRACTOMETRY: 1.02 (ref 1–1.03)
TROPONIN I SERPL-MCNC: <0.04 NG/ML
UROBILINOGEN UR QL STRIP.AUTO: 0.2 EU/DL (ref 0.2–1)
WBC # BLD AUTO: 7.7 K/UL (ref 3.6–11)
WBC URNS QL MICRO: ABNORMAL /HPF (ref 0–4)

## 2017-07-15 PROCEDURE — 74011250636 HC RX REV CODE- 250/636: Performed by: EMERGENCY MEDICINE

## 2017-07-15 PROCEDURE — 96365 THER/PROPH/DIAG IV INF INIT: CPT

## 2017-07-15 PROCEDURE — 84484 ASSAY OF TROPONIN QUANT: CPT | Performed by: PHYSICIAN ASSISTANT

## 2017-07-15 PROCEDURE — 81025 URINE PREGNANCY TEST: CPT

## 2017-07-15 PROCEDURE — 81001 URINALYSIS AUTO W/SCOPE: CPT | Performed by: PHYSICIAN ASSISTANT

## 2017-07-15 PROCEDURE — 74011000258 HC RX REV CODE- 258: Performed by: EMERGENCY MEDICINE

## 2017-07-15 PROCEDURE — 99283 EMERGENCY DEPT VISIT LOW MDM: CPT

## 2017-07-15 PROCEDURE — 80053 COMPREHEN METABOLIC PANEL: CPT | Performed by: PHYSICIAN ASSISTANT

## 2017-07-15 PROCEDURE — 71020 XR CHEST PA LAT: CPT

## 2017-07-15 PROCEDURE — 85379 FIBRIN DEGRADATION QUANT: CPT | Performed by: PHYSICIAN ASSISTANT

## 2017-07-15 PROCEDURE — 93005 ELECTROCARDIOGRAM TRACING: CPT

## 2017-07-15 PROCEDURE — 85025 COMPLETE CBC W/AUTO DIFF WBC: CPT | Performed by: PHYSICIAN ASSISTANT

## 2017-07-15 RX ORDER — CEPHALEXIN 500 MG/1
500 CAPSULE ORAL 3 TIMES DAILY
Qty: 30 CAP | Refills: 0 | Status: SHIPPED | OUTPATIENT
Start: 2017-07-15 | End: 2017-07-25

## 2017-07-15 RX ORDER — HYDROCODONE BITARTRATE AND ACETAMINOPHEN 7.5; 325 MG/1; MG/1
1 TABLET ORAL
Qty: 20 TAB | Refills: 0 | Status: SHIPPED | OUTPATIENT
Start: 2017-07-15 | End: 2017-08-19

## 2017-07-15 RX ORDER — ONDANSETRON HYDROCHLORIDE 8 MG/1
8 TABLET, FILM COATED ORAL
Qty: 20 TAB | Refills: 0 | Status: SHIPPED | OUTPATIENT
Start: 2017-07-15 | End: 2017-08-19

## 2017-07-15 RX ADMIN — CEFTRIAXONE 1 G: 1 INJECTION, POWDER, FOR SOLUTION INTRAMUSCULAR; INTRAVENOUS at 23:40

## 2017-07-16 VITALS
DIASTOLIC BLOOD PRESSURE: 65 MMHG | BODY MASS INDEX: 24.1 KG/M2 | TEMPERATURE: 98.6 F | HEART RATE: 77 BPM | RESPIRATION RATE: 16 BRPM | HEIGHT: 63 IN | OXYGEN SATURATION: 99 % | WEIGHT: 136 LBS | SYSTOLIC BLOOD PRESSURE: 101 MMHG

## 2017-07-16 LAB
ATRIAL RATE: 68 BPM
CALCULATED P AXIS, ECG09: 41 DEGREES
CALCULATED R AXIS, ECG10: 66 DEGREES
CALCULATED T AXIS, ECG11: 41 DEGREES
DIAGNOSIS, 93000: NORMAL
P-R INTERVAL, ECG05: 134 MS
Q-T INTERVAL, ECG07: 394 MS
QRS DURATION, ECG06: 86 MS
QTC CALCULATION (BEZET), ECG08: 418 MS
VENTRICULAR RATE, ECG03: 68 BPM

## 2017-07-16 NOTE — ED PROVIDER NOTES
HPI Comments: 24 y.o. female with past medical history significant for migraines with aura (all CT scans negative) and hyperprolactinemia who presents from home with chief complaint of intermittent dull, aching chest pain for six months associated with nausea, dizziness, intermittent shortness of breath, and slurred speech that started tonight. She states the dizziness affects the way she walks and has to hold onto something to remain steady. Patient's chest pain is not worse with exertion. She has been evaluated at [facility] for her symptoms and had a negative workup. She has not followed up with anyone since being seen. Additionally, patient has abdominal pain that she says is from menstrual cramps. No hx of DVT/PE. No cardiac hx. No cough or wheeze. Patient is unsure of her family hx because of she was adopted. There are no other acute medical concerns at this time. Social hx: Never smoker. Occasional alcohol use. No illicit drug use. Patient works at [employer]. PCP: Karin Sales MD    Note written by Reno Graves, as dictated by Ninoska Shine MD 9:15 PM      The history is provided by the patient. Past Medical History:   Diagnosis Date    Hyperprolactinemia ([address]     Migraines     Neurological disorder     chronic migraine with aura        Past Surgical History:   Procedure Laterality Date    HX WISDOM TEETH EXTRACTION           Family History:   Problem Relation Age of Onset    Headache Mother        Social History     Social History    Marital status: SINGLE     Spouse name: N/A    Number of children: N/A    Years of education: N/A     Occupational History    Not on file. Social History Main Topics    Smoking status: Never Smoker    Smokeless tobacco: Never Used    Alcohol use No      Comment:  Occ    Drug use: No    Sexual activity: Not on file     Other Topics Concern    Not on file     Social History Narrative    ** Merged History Encounter ** ALLERGIES: Other food and Imitrex [sumatriptan succinate]    Review of Systems   Constitutional: Negative for appetite change, chills and fever. HENT: Negative for congestion. Eyes: Negative for visual disturbance. Respiratory: Positive for shortness of breath. Negative for cough, chest tightness and wheezing. Cardiovascular: Positive for chest pain. Gastrointestinal: Positive for abdominal pain (from menses) and nausea. Negative for diarrhea and vomiting. Genitourinary: Negative for dysuria and frequency. Musculoskeletal: Positive for gait problem (from dizziness). Negative for joint swelling. Skin: Negative for rash. Neurological: Positive for dizziness and speech difficulty (\"slurred\"). Negative for headaches. All other systems reviewed and are negative. Vitals:    07/15/17 1811   BP: 121/79   Pulse: 70   Resp: 15   Temp: 98.9 °F (37.2 °C)   SpO2: 100%   Weight: 61.7 kg (136 lb)   Height: 5' 3\" (1.6 m)            Physical Exam   Constitutional: She is oriented to person, place, and time. She appears well-developed and well-nourished. No distress. HENT:   Head: Normocephalic and atraumatic. Nose: Nose normal.   Eyes: Conjunctivae are normal. Pupils are equal, round, and reactive to light. No scleral icterus. Neck: Normal range of motion. Neck supple. No JVD present. No tracheal deviation present. No thyromegaly present. Cardiovascular: Normal rate, regular rhythm and normal heart sounds. No murmur heard. Pulmonary/Chest: Effort normal and breath sounds normal. No respiratory distress. She has no wheezes. She has no rales. Abdominal: Soft. Bowel sounds are normal. She exhibits no mass. There is no tenderness. There is no rebound and no guarding. Musculoskeletal: Normal range of motion. She exhibits no edema. Neurological: She is alert and oriented to person, place, and time. No cranial nerve deficit. Coordination normal.   Skin: Skin is warm and dry.  No rash noted. She is not diaphoretic. No erythema. Psychiatric: She has a normal mood and affect. Her behavior is normal.   Nursing note and vitals reviewed. [facility]  ED Course       Procedures    PROGRESS NOTE:  11:07 PM  Patient's urinalysis reveals a UTI.

## 2017-07-16 NOTE — DISCHARGE INSTRUCTIONS
Kidney Infection: Care Instructions  Your Care Instructions  A kidney infection (pyelonephritis) is a type of urinary tract infection, or UTI. Most UTIs are bladder infections. Kidney infections tend to make people much sicker than bladder infections do. A kidney infection is also more serious because it can cause lasting damage if it is not treated quickly. 11:18 PM  Pt expresses wish to leave the emergency department against medical advice. Adverse problems related to this decision including bodily harm and death have been discussed with the patient and/or family. The patient and/or family do not appear intoxicated and appear to be capable of understanding and making a decision of such gravity. The patient and/or family express understanding of the possible adverse outcomes of their decision and still express the wish to leave against medical advice. The patient and/or family were given follow up and return instructions and the available laboratory tests and x-rays were discussed. The patient and/or family were given the opportunity to ask questions. The patient and/or family agree to follow up with a physician of their choice as soon as possible or to return to the ER at any time to finish the work-up. Follow-up care is a key part of your treatment and safety. Be sure to make and go to all appointments, and call your doctor if you are having problems. It's also a good idea to know your test results and keep a list of the medicines you take. How can you care for yourself at home? · Take your antibiotics as directed. Do not stop taking them just because you feel better. You need to take the full course of antibiotics. · Drink plenty of water, enough so that your urine is light yellow or clear like water. This may help wash out bacteria that are causing the infection.  If you have kidney, heart, or liver disease and have to limit fluids, talk with your doctor before you increase the amount of fluids you drink. · Urinate often. Try to empty your bladder each time. · To relieve pain, take a hot shower or lay a heating pad (set on low) over your lower belly. Never go to sleep with a heating pad in place. Put a thin cloth between the heating pad and your skin. To help prevent kidney infections  · Drink plenty of water each day. This helps you urinate often, which clears bacteria from your system. If you have kidney, heart, or liver disease and have to limit fluids, talk with your doctor before you increase the amount of fluids you drink. · Urinate when you have the urge. Do not hold your urine for a long time. Urinate before you go to sleep. · If you have symptoms of a bladder infection, such as burning when you urinate or having to urinate often, call your doctor so you can treat the problem before it gets worse. If you do not treat a bladder infection quickly, it can spread to the kidney. · Men should keep the tip of the penis clean. If you are a woman, keep these ideas in mind:  · Urinate right after you have sex. · Change sanitary pads often. Avoid douches, feminine hygiene sprays, and other feminine hygiene products that have deodorants. · After going to the bathroom, wipe from front to back. When should you call for help? Call your doctor now or seek immediate medical care if:  · You have increasing pain in your back just below the rib cage. This is called flank pain. · You have a new or higher fever and chills. · You are vomiting or nauseated. Watch closely for changes in your health, and be sure to contact your doctor if:  · Symptoms, such as burning when you urinate, get worse or get better but then come back. · You are not getting better after 2 days. Where can you learn more? Go to http://jadyn-dannie.info/. Enter F595 in the search box to learn more about \"Kidney Infection: Care Instructions. \"  Current as of: November 28, 2016  Content Version: 11.3  © 1566-1080 [employer]. Care instructions adapted under license by [employer] (which disclaims liability or warranty for this information). If you have questions about a medical condition or this instruction, always ask your healthcare professional. [address] any warranty or liability for your use of this information. We hope that we have addressed all of your medical concerns. The examination and treatment you received in the Emergency Department were for an emergent problem and were not intended as complete care. It is important that you follow up with your healthcare provider(s) for ongoing care. If your symptoms worsen or do not improve as expected, and you are unable to reach your usual health care provider(s), you should return to the Emergency Department. Today's healthcare is undergoing tremendous change, and patient satisfaction surveys are one of the many tools to assess the quality of medical care. You may receive a survey from the [employer] regarding your experience in the Emergency Department. I hope that your experience has been completely positive, particularly the medical care that I provided. As such, please participate in the survey; anything less than excellent does not meet my expectations or intentions. [address] [facility] and [address] [place] [address] participate in nationally recognized quality of care measures. If your blood pressure is greater than 120/80, as reported below, we urge that you seek medical care to address the potential of high blood pressure, commonly known as hypertension. Hypertension can be hereditary or can be caused by certain medical conditions, pain, stress, or \"white coat syndrome. \"       Please make an appointment with your health care provider(s) for follow up of your Emergency Department visit.        VITALS:   Patient Vitals for the past 8 hrs:   Temp Pulse Resp BP SpO2   07/15/17 1811 98.9 °F (37.2 °C) 70 15 121/79 100 %          Thank you for allowing us to provide you with medical care today. We realize that you have many choices for your emergency care needs. Please choose us in the future for any continued health care needs. Jaime Memory Johnie Prader, [address]: [phone]            Recent Results (from the past 24 hour(s))   EKG, 12 LEAD, INITIAL    Collection Time: 07/15/17  6:25 PM   Result Value Ref Range    Ventricular Rate 68 BPM    Atrial Rate 68 BPM    P-R Interval 134 ms    QRS Duration 86 ms    Q-T Interval 394 ms    QTC Calculation (Bezet) 418 ms    Calculated P Axis 41 degrees    Calculated R Axis 66 degrees    Calculated T Axis 41 degrees    Diagnosis       Normal sinus rhythm with sinus arrhythmia  When compared with ECG of 05-MAY-2017 23:00,  Vent. rate has decreased BY  34 BPM  T wave inversion no longer evident in Inferior leads  QT has shortened     CBC WITH AUTOMATED DIFF    Collection Time: 07/15/17  6:27 PM   Result Value Ref Range    WBC 7.7 3.6 - 11.0 K/uL    RBC 4.38 3.80 - 5.20 M/uL    HGB 13.3 11.5 - 16.0 g/dL    HCT 39.4 35.0 - 47.0 %    MCV 90.0 80.0 - 99.0 FL    MCH 30.4 26.0 - 34.0 PG    MCHC 33.8 30.0 - 36.5 g/dL    RDW 12.8 11.5 - 14.5 %    PLATELET 127 052 - 426 K/uL    NEUTROPHILS 53 32 - 75 %    LYMPHOCYTES 35 12 - 49 %    MONOCYTES 8 5 - 13 %    EOSINOPHILS 3 0 - 7 %    BASOPHILS 1 0 - 1 %    ABS. NEUTROPHILS 4.1 1.8 - 8.0 K/UL    ABS. LYMPHOCYTES 2.7 0.8 - 3.5 K/UL    ABS. MONOCYTES 0.6 0.0 - 1.0 K/UL    ABS. EOSINOPHILS 0.2 0.0 - 0.4 K/UL    ABS.  BASOPHILS 0.0 0.0 - 0.1 K/UL   METABOLIC PANEL, COMPREHENSIVE    Collection Time: 07/15/17  6:27 PM   Result Value Ref Range    Sodium 137 136 - 145 mmol/L    Potassium 3.9 3.5 - 5.1 mmol/L    Chloride 104 97 - 108 mmol/L    CO2 28 21 - 32 mmol/L    Anion gap 5 5 - 15 mmol/L    Glucose 84 65 - 100 mg/dL    BUN 14 6 - 20 MG/DL    Creatinine 0.90 0.55 - 1.02 MG/DL    BUN/Creatinine ratio 16 12 - 20      GFR est AA >60 >60 ml/min/1.73m2    GFR est non-AA >60 >60 ml/min/1.73m2    Calcium 8.4 (L) 8.5 - 10.1 MG/DL    Bilirubin, total 0.2 0.2 - 1.0 MG/DL    ALT (SGPT) 23 12 - 78 U/L    AST (SGOT) 14 (L) 15 - 37 U/L    Alk. phosphatase 44 (L) 45 - 117 U/L    Protein, total 7.3 6.4 - 8.2 g/dL    Albumin 4.0 3.5 - 5.0 g/dL    Globulin 3.3 2.0 - 4.0 g/dL    A-G Ratio 1.2 1.1 - 2.2     TROPONIN I    Collection Time: 07/15/17  6:27 PM   Result Value Ref Range    Troponin-I, Qt. <0.04 <0.05 ng/mL   D DIMER    Collection Time: 07/15/17  6:27 PM   Result Value Ref Range    D-dimer <0.17 0.00 - 0.65 mg/L FEU   HCG URINE, QL. - POC    Collection Time: 07/15/17 10:02 PM   Result Value Ref Range    Pregnancy test,urine (POC) NEGATIVE  NEG     URINALYSIS W/ RFLX MICROSCOPIC    Collection Time: 07/15/17 10:03 PM   Result Value Ref Range    Color YELLOW/STRAW      Appearance CLOUDY (A) CLEAR      Specific gravity 1.017 1.003 - 1.030      pH (UA) 6.5 5.0 - 8.0      Protein NEGATIVE  NEG mg/dL    Glucose NEGATIVE  NEG mg/dL    Ketone NEGATIVE  NEG mg/dL    Bilirubin NEGATIVE  NEG      Blood NEGATIVE  NEG      Urobilinogen 0.2 0.2 - 1.0 EU/dL    Nitrites POSITIVE (A) NEG      Leukocyte Esterase LARGE (A) NEG      WBC  0 - 4 /hpf    RBC 0-5 0 - 5 /hpf    Epithelial cells FEW FEW /lpf    Bacteria 4+ (A) NEG /hpf    Hyaline cast 0-2 0 - 5 /lpf       Xr Chest Pa Lat    Result Date: 7/15/2017  EXAM:  XR CHEST PA LAT INDICATION:   chest pain and SOB COMPARISON: 5/28/2017. FINDINGS: PA and lateral radiographs of the chest demonstrate clear lungs. The cardiac and mediastinal contours and pulmonary vascularity are normal.  The bones and soft tissues are within normal limits. IMPRESSION: No acute finding                We hope that we have addressed all of your medical concerns.  The examination and treatment you received in the Emergency Department were for an emergent problem and were not intended as complete care. It is important that you follow up with your healthcare provider(s) for ongoing care. If your symptoms worsen or do not improve as expected, and you are unable to reach your usual health care provider(s), you should return to the Emergency Department. Today's healthcare is undergoing tremendous change, and patient satisfaction surveys are one of the many tools to assess the quality of medical care. You may receive a survey from the [employer] regarding your experience in the Emergency Department. I hope that your experience has been completely positive, particularly the medical care that I provided. As such, please participate in the survey; anything less than excellent does not meet my expectations or intentions. [facility] and [employer] participate in nationally recognized quality of care measures. If your blood pressure is greater than 120/80, as reported below, we urge that you seek medical care to address the potential of high blood pressure, commonly known as hypertension. Hypertension can be hereditary or can be caused by certain medical conditions, pain, stress, or \"white coat syndrome. \"       Please make an appointment with your health care provider(s) for follow up of your Emergency Department visit. VITALS:   Patient Vitals for the past 8 hrs:   Temp Pulse Resp BP SpO2   07/15/17 1811 98.9 °F (37.2 °C) 70 15 121/79 100 %          Thank you for allowing us to provide you with medical care today. We realize that you have many choices for your emergency care needs. Please choose us in the future for any continued health care needs. Gladys Lanes Cris Joe, [address] [place] [address].   Office: [phone]            Recent Results (from the past 24 hour(s))   EKG, 12 LEAD, INITIAL    Collection Time: 07/15/17  6:25 PM   Result Value Ref Range    Ventricular Rate 68 BPM    Atrial Rate 68 BPM    P-R Interval 134 ms    QRS Duration 86 ms    Q-T Interval 394 ms    QTC Calculation (Bezet) 418 ms    Calculated P Axis 41 degrees    Calculated R Axis 66 degrees    Calculated T Axis 41 degrees    Diagnosis       Normal sinus rhythm with sinus arrhythmia  When compared with ECG of 05-MAY-2017 23:00,  Vent. rate has decreased BY  34 BPM  T wave inversion no longer evident in Inferior leads  QT has shortened     CBC WITH AUTOMATED DIFF    Collection Time: 07/15/17  6:27 PM   Result Value Ref Range    WBC 7.7 3.6 - 11.0 K/uL    RBC 4.38 3.80 - 5.20 M/uL    HGB 13.3 11.5 - 16.0 g/dL    HCT 39.4 35.0 - 47.0 %    MCV 90.0 80.0 - 99.0 FL    MCH 30.4 26.0 - 34.0 PG    MCHC 33.8 30.0 - 36.5 g/dL    RDW 12.8 11.5 - 14.5 %    PLATELET 173 186 - 184 K/uL    NEUTROPHILS 53 32 - 75 %    LYMPHOCYTES 35 12 - 49 %    MONOCYTES 8 5 - 13 %    EOSINOPHILS 3 0 - 7 %    BASOPHILS 1 0 - 1 %    ABS. NEUTROPHILS 4.1 1.8 - 8.0 K/UL    ABS. LYMPHOCYTES 2.7 0.8 - 3.5 K/UL    ABS. MONOCYTES 0.6 0.0 - 1.0 K/UL    ABS. EOSINOPHILS 0.2 0.0 - 0.4 K/UL    ABS. BASOPHILS 0.0 0.0 - 0.1 K/UL   METABOLIC PANEL, COMPREHENSIVE    Collection Time: 07/15/17  6:27 PM   Result Value Ref Range    Sodium 137 136 - 145 mmol/L    Potassium 3.9 3.5 - 5.1 mmol/L    Chloride 104 97 - 108 mmol/L    CO2 28 21 - 32 mmol/L    Anion gap 5 5 - 15 mmol/L    Glucose 84 65 - 100 mg/dL    BUN 14 6 - 20 MG/DL    Creatinine 0.90 0.55 - 1.02 MG/DL    BUN/Creatinine ratio 16 12 - 20      GFR est AA >60 >60 ml/min/1.73m2    GFR est non-AA >60 >60 ml/min/1.73m2    Calcium 8.4 (L) 8.5 - 10.1 MG/DL    Bilirubin, total 0.2 0.2 - 1.0 MG/DL    ALT (SGPT) 23 12 - 78 U/L    AST (SGOT) 14 (L) 15 - 37 U/L    Alk.  phosphatase 44 (L) 45 - 117 U/L    Protein, total 7.3 6.4 - 8.2 g/dL    Albumin 4.0 3.5 - 5.0 g/dL    Globulin 3.3 2.0 - 4.0 g/dL    A-G Ratio 1.2 1.1 - 2.2     TROPONIN I    Collection Time: 07/15/17  6:27 PM   Result Value Ref Range    Troponin-I, Qt. <0.04 <0.05 ng/mL   D DIMER    Collection Time: 07/15/17  6:27 PM   Result Value Ref Range    D-dimer <0.17 0.00 - 0.65 mg/L FEU   HCG URINE, QL. - POC    Collection Time: 07/15/17 10:02 PM   Result Value Ref Range    Pregnancy test,urine (POC) NEGATIVE  NEG     URINALYSIS W/ RFLX MICROSCOPIC    Collection Time: 07/15/17 10:03 PM   Result Value Ref Range    Color YELLOW/STRAW      Appearance CLOUDY (A) CLEAR      Specific gravity 1.017 1.003 - 1.030      pH (UA) 6.5 5.0 - 8.0      Protein NEGATIVE  NEG mg/dL    Glucose NEGATIVE  NEG mg/dL    Ketone NEGATIVE  NEG mg/dL    Bilirubin NEGATIVE  NEG      Blood NEGATIVE  NEG      Urobilinogen 0.2 0.2 - 1.0 EU/dL    Nitrites POSITIVE (A) NEG      Leukocyte Esterase LARGE (A) NEG      WBC  0 - 4 /hpf    RBC 0-5 0 - 5 /hpf    Epithelial cells FEW FEW /lpf    Bacteria 4+ (A) NEG /hpf    Hyaline cast 0-2 0 - 5 /lpf       Xr Chest Pa Lat    Result Date: 7/15/2017  EXAM:  XR CHEST PA LAT INDICATION:   chest pain and SOB COMPARISON: 5/28/2017. FINDINGS: PA and lateral radiographs of the chest demonstrate clear lungs. The cardiac and mediastinal contours and pulmonary vascularity are normal.  The bones and soft tissues are within normal limits.      IMPRESSION: No acute finding

## 2017-07-19 ENCOUNTER — HOSPITAL ENCOUNTER (EMERGENCY)
Age: 27
Discharge: HOME | End: 2017-07-19
Attending: EMERGENCY MEDICINE
Payer: OTHER GOVERNMENT

## 2017-07-19 ENCOUNTER — APPOINTMENT (OUTPATIENT)
Dept: CT IMAGING | Age: 27
End: 2017-07-19
Attending: PHYSICIAN ASSISTANT
Payer: OTHER GOVERNMENT

## 2017-07-19 VITALS
SYSTOLIC BLOOD PRESSURE: 106 MMHG | WEIGHT: 136.25 LBS | RESPIRATION RATE: 16 BRPM | DIASTOLIC BLOOD PRESSURE: 72 MMHG | HEART RATE: 78 BPM | OXYGEN SATURATION: 100 % | BODY MASS INDEX: 24.14 KG/M2 | TEMPERATURE: 98.1 F | HEIGHT: 63 IN

## 2017-07-19 DIAGNOSIS — N23 RENAL COLIC: Primary | ICD-10-CM

## 2017-07-19 DIAGNOSIS — N39.0 URINARY TRACT INFECTION WITHOUT HEMATURIA, SITE UNSPECIFIED: ICD-10-CM

## 2017-07-19 LAB
ALBUMIN SERPL BCP-MCNC: 4 G/DL (ref 3.5–5)
ALBUMIN/GLOB SERPL: 1.2 {RATIO} (ref 1.1–2.2)
ALP SERPL-CCNC: 47 U/L (ref 45–117)
ALT SERPL-CCNC: 21 U/L (ref 12–78)
ANION GAP BLD CALC-SCNC: 5 MMOL/L (ref 5–15)
APPEARANCE UR: CLEAR
AST SERPL W P-5'-P-CCNC: 11 U/L (ref 15–37)
BACTERIA URNS QL MICRO: NEGATIVE /HPF
BASOPHILS # BLD AUTO: 0 K/UL (ref 0–0.1)
BASOPHILS # BLD: 1 % (ref 0–1)
BILIRUB SERPL-MCNC: 0.2 MG/DL (ref 0.2–1)
BILIRUB UR QL: NEGATIVE
BUN SERPL-MCNC: 14 MG/DL (ref 6–20)
BUN/CREAT SERPL: 18 (ref 12–20)
CALCIUM SERPL-MCNC: 8.7 MG/DL (ref 8.5–10.1)
CHLORIDE SERPL-SCNC: 103 MMOL/L (ref 97–108)
CO2 SERPL-SCNC: 28 MMOL/L (ref 21–32)
COLOR UR: ABNORMAL
CREAT SERPL-MCNC: 0.76 MG/DL (ref 0.55–1.02)
EOSINOPHIL # BLD: 0.4 K/UL (ref 0–0.4)
EOSINOPHIL NFR BLD: 4 % (ref 0–7)
EPITH CASTS URNS QL MICRO: ABNORMAL /LPF
ERYTHROCYTE [DISTWIDTH] IN BLOOD BY AUTOMATED COUNT: 12.4 % (ref 11.5–14.5)
GLOBULIN SER CALC-MCNC: 3.4 G/DL (ref 2–4)
GLUCOSE SERPL-MCNC: 86 MG/DL (ref 65–100)
GLUCOSE UR STRIP.AUTO-MCNC: NEGATIVE MG/DL
HCG UR QL: NEGATIVE
HCT VFR BLD AUTO: 40.3 % (ref 35–47)
HGB BLD-MCNC: 13.9 G/DL (ref 11.5–16)
HGB UR QL STRIP: NEGATIVE
HYALINE CASTS URNS QL MICRO: ABNORMAL /LPF (ref 0–5)
KETONES UR QL STRIP.AUTO: NEGATIVE MG/DL
LEUKOCYTE ESTERASE UR QL STRIP.AUTO: ABNORMAL
LYMPHOCYTES # BLD AUTO: 37 % (ref 12–49)
LYMPHOCYTES # BLD: 3 K/UL (ref 0.8–3.5)
MCH RBC QN AUTO: 30.4 PG (ref 26–34)
MCHC RBC AUTO-ENTMCNC: 34.5 G/DL (ref 30–36.5)
MCV RBC AUTO: 88.2 FL (ref 80–99)
MONOCYTES # BLD: 0.5 K/UL (ref 0–1)
MONOCYTES NFR BLD AUTO: 6 % (ref 5–13)
NEUTS SEG # BLD: 4.1 K/UL (ref 1.8–8)
NEUTS SEG NFR BLD AUTO: 52 % (ref 32–75)
NITRITE UR QL STRIP.AUTO: NEGATIVE
PH UR STRIP: 6 [PH] (ref 5–8)
PLATELET # BLD AUTO: 240 K/UL (ref 150–400)
POTASSIUM SERPL-SCNC: 3.7 MMOL/L (ref 3.5–5.1)
PROT SERPL-MCNC: 7.4 G/DL (ref 6.4–8.2)
PROT UR STRIP-MCNC: NEGATIVE MG/DL
RBC # BLD AUTO: 4.57 M/UL (ref 3.8–5.2)
RBC #/AREA URNS HPF: ABNORMAL /HPF (ref 0–5)
SODIUM SERPL-SCNC: 136 MMOL/L (ref 136–145)
SP GR UR REFRACTOMETRY: 1.01 (ref 1–1.03)
UROBILINOGEN UR QL STRIP.AUTO: 0.2 EU/DL (ref 0.2–1)
WBC # BLD AUTO: 7.9 K/UL (ref 3.6–11)
WBC URNS QL MICRO: ABNORMAL /HPF (ref 0–4)

## 2017-07-19 PROCEDURE — 36415 COLL VENOUS BLD VENIPUNCTURE: CPT | Performed by: EMERGENCY MEDICINE

## 2017-07-19 PROCEDURE — 87086 URINE CULTURE/COLONY COUNT: CPT | Performed by: PHYSICIAN ASSISTANT

## 2017-07-19 PROCEDURE — 81001 URINALYSIS AUTO W/SCOPE: CPT | Performed by: EMERGENCY MEDICINE

## 2017-07-19 PROCEDURE — 74011250636 HC RX REV CODE- 250/636: Performed by: PHYSICIAN ASSISTANT

## 2017-07-19 PROCEDURE — 85025 COMPLETE CBC W/AUTO DIFF WBC: CPT | Performed by: EMERGENCY MEDICINE

## 2017-07-19 PROCEDURE — 74011250637 HC RX REV CODE- 250/637: Performed by: PHYSICIAN ASSISTANT

## 2017-07-19 PROCEDURE — 80053 COMPREHEN METABOLIC PANEL: CPT | Performed by: EMERGENCY MEDICINE

## 2017-07-19 PROCEDURE — 96374 THER/PROPH/DIAG INJ IV PUSH: CPT

## 2017-07-19 PROCEDURE — 81025 URINE PREGNANCY TEST: CPT

## 2017-07-19 PROCEDURE — 96361 HYDRATE IV INFUSION ADD-ON: CPT

## 2017-07-19 PROCEDURE — 96375 TX/PRO/DX INJ NEW DRUG ADDON: CPT

## 2017-07-19 PROCEDURE — 74176 CT ABD & PELVIS W/O CONTRAST: CPT

## 2017-07-19 PROCEDURE — 99283 EMERGENCY DEPT VISIT LOW MDM: CPT

## 2017-07-19 RX ORDER — KETOROLAC TROMETHAMINE 30 MG/ML
30 INJECTION, SOLUTION INTRAMUSCULAR; INTRAVENOUS
Status: COMPLETED | OUTPATIENT
Start: 2017-07-19 | End: 2017-07-19

## 2017-07-19 RX ORDER — CEFDINIR 300 MG/1
300 CAPSULE ORAL 2 TIMES DAILY
Qty: 7 CAP | Refills: 0 | Status: SHIPPED | OUTPATIENT
Start: 2017-07-19 | End: 2017-08-19

## 2017-07-19 RX ORDER — ONDANSETRON 4 MG/1
4 TABLET, ORALLY DISINTEGRATING ORAL
Qty: 12 TAB | Refills: 0 | Status: SHIPPED | OUTPATIENT
Start: 2017-07-19 | End: 2017-07-29

## 2017-07-19 RX ORDER — CEFDINIR 300 MG/1
300 CAPSULE ORAL ONCE
Status: COMPLETED | OUTPATIENT
Start: 2017-07-19 | End: 2017-07-19

## 2017-07-19 RX ORDER — POLYETHYLENE GLYCOL 3350 17 G/17G
17 POWDER, FOR SOLUTION ORAL DAILY
Qty: 238 G | Refills: 0 | Status: SHIPPED | OUTPATIENT
Start: 2017-07-19 | End: 2018-04-04

## 2017-07-19 RX ORDER — ONDANSETRON 2 MG/ML
4 INJECTION INTRAMUSCULAR; INTRAVENOUS
Status: COMPLETED | OUTPATIENT
Start: 2017-07-19 | End: 2017-07-19

## 2017-07-19 RX ORDER — NAPROXEN 500 MG/1
500 TABLET ORAL
Qty: 20 TAB | Refills: 0 | Status: SHIPPED | OUTPATIENT
Start: 2017-07-19 | End: 2018-04-04

## 2017-07-19 RX ADMIN — KETOROLAC TROMETHAMINE 30 MG: 60 INJECTION, SOLUTION INTRAMUSCULAR at 20:22

## 2017-07-19 RX ADMIN — SODIUM CHLORIDE 1000 ML: 900 INJECTION, SOLUTION INTRAVENOUS at 20:22

## 2017-07-19 RX ADMIN — ONDANSETRON HYDROCHLORIDE 4 MG: 2 INJECTION, SOLUTION INTRAVENOUS at 20:22

## 2017-07-19 RX ADMIN — CEFDINIR 300 MG: 300 CAPSULE ORAL at 21:59

## 2017-07-19 NOTE — ED TRIAGE NOTES
Pt stated she was here 3 days ago and was dx with a kidney infection, pt stated she is now having right flank pain, denies fever, n/v is better now, denies dysuria

## 2017-07-20 NOTE — ED PROVIDER NOTES
HPI Comments: 23 yo female with hx of migraines and kidney infection here for evaluation of flank pain. States she was seen in ER 3 days ago and dx with UTI. States she has continued with N/V; states she has been able to take the Keflex but not the pain medications. Two days ago began with pain to right flank. No medications taken. Denies fever, chills, CP, SOB, urinary symptoms. Non smoker. Patient is a 24 y.o. female presenting with flank pain. Flank Pain    This is a new problem. The current episode started 2 days ago. The problem occurs constantly. The pain is associated with no known injury. The pain is present in the right side. The quality of the pain is described as shooting and aching. The pain is at a severity of 9/10. The pain is mild. Pertinent negatives include no chest pain, no fever, no numbness, no headaches, no abdominal pain, no dysuria and no weakness. Past Medical History:   Diagnosis Date    Hyperprolactinemia ([facility] [address]     Ill-defined condition     kidney infection     Migraines     Neurological disorder     chronic migraine with aura        Past Surgical History:   Procedure Laterality Date    HX WISDOM TEETH EXTRACTION           Family History:   Problem Relation Age of Onset    Headache Mother        Social History     Social History    Marital status: SINGLE     Spouse name: N/A    Number of children: N/A    Years of education: N/A     Occupational History    Not on file. Social History Main Topics    Smoking status: Never Smoker    Smokeless tobacco: Never Used    Alcohol use No      Comment: Occ    Drug use: No    Sexual activity: Not on file     Other Topics Concern    Not on file     Social History Narrative    ** Merged History Encounter **              ALLERGIES: Other food and Imitrex [sumatriptan succinate]    Review of Systems   Constitutional: Negative. Negative for fever. HENT: Negative for ear discharge.     Eyes: Negative for photophobia, pain, discharge and visual disturbance. Respiratory: Negative for apnea, cough, chest tightness and shortness of breath. Cardiovascular: Negative for chest pain, palpitations and leg swelling. Gastrointestinal: Positive for nausea and vomiting. Negative for abdominal distention, abdominal pain and blood in stool. Genitourinary: Positive for flank pain. Negative for difficulty urinating, dysuria, frequency and hematuria. Musculoskeletal: Negative for back pain, gait problem, joint swelling, myalgias and neck pain. Skin: Negative for color change and pallor. Neurological: Negative for dizziness, syncope, weakness, numbness and headaches. Psychiatric/Behavioral: Negative for behavioral problems and confusion. The patient is not nervous/anxious. Vitals:    07/19/17 1946   BP: 126/84   Pulse: 98   Resp: 16   Temp: 97.5 °F (36.4 °C)   SpO2: 99%   Weight: 61.8 kg (136 lb 4 oz)   Height: 5' 3\" (1.6 m)            Physical Exam   Constitutional: She is oriented to person, place, and time. She appears well-developed and well-nourished. HENT:   Head: Normocephalic and atraumatic. Right Ear: External ear normal.   Left Ear: External ear normal.   Nose: Nose normal.   Mouth/Throat: Oropharynx is clear and moist.   Eyes: Conjunctivae and EOM are normal. Pupils are equal, round, and reactive to light. Right eye exhibits no discharge. Left eye exhibits no discharge. Neck: Normal range of motion. Neck supple. Cardiovascular: Normal rate, regular rhythm, normal heart sounds and intact distal pulses. Pulmonary/Chest: Effort normal and breath sounds normal.   Abdominal: Soft. Bowel sounds are normal. She exhibits no distension. There is tenderness (Right flank). There is no rebound and no guarding. Musculoskeletal: Normal range of motion. She exhibits no edema or tenderness. Neurological: She is alert and oriented to person, place, and time. No cranial nerve deficit.  Coordination normal.   Skin: Skin is warm and dry. No rash noted. Psychiatric: She has a normal mood and affect. Her behavior is normal. Judgment and thought content normal.   Nursing note and vitals reviewed. MDM  Number of Diagnoses or Management Options  Renal colic:   Urinary tract infection without hematuria, site unspecified:   Diagnosis management comments: 23 yo female with recent UTI here for flank pain; urine appears improved and CT with stone in kidney; will treat pain and change to [facility]; pt to follow up if continued symptoms. RORY Taylor         Amount and/or Complexity of Data Reviewed  Clinical lab tests: ordered and reviewed  Tests in the radiology section of CPT®: ordered and reviewed  Decide to obtain previous medical records or to obtain history from someone other than the patient: yes  Review and summarize past medical records: yes  Discuss the patient with other providers: yes      ED Course       Procedures    Patient has been reassessed. Feeling better; pt with no N/V in ED and able to tolerate PO's. Reviewed labs, medications and radiographics with patient. Ready to discharge home. Discussed case with attending Physician Clay Moe. Agrees with care and will D/C with follow up. Patient's results have been reviewed with them. Patient and/or family have verbally conveyed their understanding and agreement of the patient's signs, symptoms, diagnosis, treatment and prognosis and additionally agree to follow up as recommended or return to the Emergency Room should their condition change prior to follow-up. Discharge instructions have also been provided to the patient with some educational information regarding their diagnosis as well a list of reasons why they would want to return to the ER prior to their follow-up appointment should their condition change.   RORY Taylor

## 2017-07-20 NOTE — DISCHARGE INSTRUCTIONS
Flank Pain: Care Instructions  Your Care Instructions  Flank pain is pain on the side of the back just below the rib cage and above the waist. It can be on one or both sides. Flank pain has many possible causes, including a kidney stone, a urinary tract infection, or back strain. Flank pain may get better on its own. But don't ignore new symptoms, such as fever, nausea and vomiting, urination problems, pain that gets worse, and dizziness. These may be signs of a more serious problem. You may have to have tests or other treatment. Follow-up care is a key part of your treatment and safety. Be sure to make and go to all appointments, and call your doctor if you are having problems. It's also a good idea to know your test results and keep a list of the medicines you take. How can you care for yourself at home? · Rest until you feel better. · Take pain medicines exactly as directed. ¨ If the doctor gave you a prescription medicine for pain, take it as prescribed. ¨ If you are not taking a prescription pain medicine, ask your doctor if you can take an over-the-counter pain medicine, such as acetaminophen (Tylenol), ibuprofen (Advil, Motrin), or naproxen (Aleve). Read and follow all instructions on the label. · If your doctor prescribed antibiotics, take them as directed. Do not stop taking them just because you feel better. You need to take the full course of antibiotics. · To apply heat, put a warm water bottle, a heating pad set on low, or a warm cloth on the painful area. Do not go to sleep with a heating pad on your skin. · To prevent dehydration, drink plenty of fluids, enough so that your urine is light yellow or clear like water. Choose water and other caffeine-free clear liquids until you feel better. If you have kidney, heart, or liver disease and have to limit fluids, talk with your doctor before you increase the amount of fluids you drink. When should you call for help?   Call your doctor now or seek immediate medical care if:  · Your flank pain gets worse. · You have new symptoms, such as fever, nausea, or vomiting. · You have symptoms of a urinary problem. For example:  ¨ You have blood or pus in your urine. ¨ You have chills or body aches. ¨ It hurts to urinate. ¨ You have groin or belly pain. Watch closely for changes in your health, and be sure to contact your doctor if you do not get better as expected. Where can you learn more? Go to http://jadyn-dannie.info/. Enter S191 in the search box to learn more about \"Flank Pain: Care Instructions. \"  Current as of: March 20, 2017  Content Version: 11.3  © 1639-7638 [employer]. Care instructions adapted under license by [employer] (which disclaims liability or warranty for this information). If you have questions about a medical condition or this instruction, always ask your healthcare professional. [place] [address] any warranty or liability for your use of this information. We hope that we have addressed all of your medical concerns. The examination and treatment you received in the Emergency Department were for an emergent problem and were not intended as complete care. It is important that you follow up with your healthcare provider(s) for ongoing care. If your symptoms worsen or do not improve as expected, and you are unable to reach your usual health care provider(s), you should return to the Emergency Department. Today's healthcare is undergoing tremendous change, and patient satisfaction surveys are one of the many tools to assess the quality of medical care. You may receive a survey from the [employer] organization regarding your experience in the Emergency Department. I hope that your experience has been completely positive, particularly the medical care that I provided.   As such, please participate in the survey; anything less than excellent does not meet my expectations or intentions. [facility] and [facility] participate in nationally recognized quality of care measures. If your blood pressure is greater than 120/80, as reported below, we urge that you seek medical care to address the potential of high blood pressure, commonly known as hypertension. Hypertension can be hereditary or can be caused by certain medical conditions, pain, stress, or \"white coat syndrome. \"       Please make an appointment with your health care provider(s) for follow up of your Emergency Department visit. VITALS:   Patient Vitals for the past 8 hrs:   Temp Pulse Resp BP SpO2   07/19/17 1946 97.5 °F (36.4 °C) 98 16 126/84 99 %          Thank you for allowing us to provide you with medical care today. We realize that you have many choices for your emergency care needs. Please choose us in the future for any continued health care needs. Vu Landis, [address] [place] [address] 20.   Office: [phone]            Recent Results (from the past 24 hour(s))   URINALYSIS W/MICROSCOPIC    Collection Time: 07/19/17  8:04 PM   Result Value Ref Range    Color YELLOW/STRAW      Appearance CLEAR CLEAR      Specific gravity 1.008 1.003 - 1.030      pH (UA) 6.0 5.0 - 8.0      Protein NEGATIVE  NEG mg/dL    Glucose NEGATIVE  NEG mg/dL    Ketone NEGATIVE  NEG mg/dL    Bilirubin NEGATIVE  NEG      Blood NEGATIVE  NEG      Urobilinogen 0.2 0.2 - 1.0 EU/dL    Nitrites NEGATIVE  NEG      Leukocyte Esterase SMALL (A) NEG      WBC 5-10 0 - 4 /hpf    RBC 0-5 0 - 5 /hpf    Epithelial cells FEW FEW /lpf    Bacteria NEGATIVE  NEG /hpf    Hyaline cast 0-2 0 - 5 /lpf   CBC WITH AUTOMATED DIFF    Collection Time: 07/19/17  8:05 PM   Result Value Ref Range    WBC 7.9 3.6 - 11.0 K/uL    RBC 4.57 3.80 - 5.20 M/uL    HGB 13.9 11.5 - 16.0 g/dL    HCT 40.3 35.0 - 47.0 %    MCV 88.2 80.0 - 99.0 FL    MCH 30.4 26.0 - 34.0 PG    MCHC 34.5 30.0 - 36.5 g/dL    RDW 12.4 11.5 - 14.5 %    PLATELET 123 640 - 659 K/uL    NEUTROPHILS 52 32 - 75 %    LYMPHOCYTES 37 12 - 49 %    MONOCYTES 6 5 - 13 %    EOSINOPHILS 4 0 - 7 %    BASOPHILS 1 0 - 1 %    ABS. NEUTROPHILS 4.1 1.8 - 8.0 K/UL    ABS. LYMPHOCYTES 3.0 0.8 - 3.5 K/UL    ABS. MONOCYTES 0.5 0.0 - 1.0 K/UL    ABS. EOSINOPHILS 0.4 0.0 - 0.4 K/UL    ABS. BASOPHILS 0.0 0.0 - 0.1 K/UL   METABOLIC PANEL, COMPREHENSIVE    Collection Time: 07/19/17  8:05 PM   Result Value Ref Range    Sodium 136 136 - 145 mmol/L    Potassium 3.7 3.5 - 5.1 mmol/L    Chloride 103 97 - 108 mmol/L    CO2 28 21 - 32 mmol/L    Anion gap 5 5 - 15 mmol/L    Glucose 86 65 - 100 mg/dL    BUN 14 6 - 20 MG/DL    Creatinine 0.76 0.55 - 1.02 MG/DL    BUN/Creatinine ratio 18 12 - 20      GFR est AA >60 >60 ml/min/1.73m2    GFR est non-AA >60 >60 ml/min/1.73m2    Calcium 8.7 8.5 - 10.1 MG/DL    Bilirubin, total 0.2 0.2 - 1.0 MG/DL    ALT (SGPT) 21 12 - 78 U/L    AST (SGOT) 11 (L) 15 - 37 U/L    Alk. phosphatase 47 45 - 117 U/L    Protein, total 7.4 6.4 - 8.2 g/dL    Albumin 4.0 3.5 - 5.0 g/dL    Globulin 3.4 2.0 - 4.0 g/dL    A-G Ratio 1.2 1.1 - 2.2     HCG URINE, QL. - POC    Collection Time: 07/19/17  8:55 PM   Result Value Ref Range    Pregnancy test,urine (POC) NEGATIVE  NEG         Ct Abd Pelv Wo Cont    Result Date: 7/19/2017  EXAM:  CT ABD PELV WO CONT INDICATION: Right flank pain without fever or dysuria. Recent diagnosis of kidney infection. Normal white blood cell count. No hematuria on urinalysis. Normal liver enzymes. COMPARISON: None CONTRAST:  None. TECHNIQUE: Thin axial images were obtained through the abdomen and pelvis. Coronal and sagittal reconstructions were generated. Oral contrast was not administered. CT dose reduction was achieved through use of a standardized protocol tailored for this examination and automatic exposure control for dose modulation. Adaptive statistical iterative reconstruction (ASIR) was utilized. The absence of intravenous contrast material reduces the sensitivity for evaluation of the solid parenchymal organs of the abdomen. FINDINGS: LUNG BASES: Clear. INCIDENTALLY IMAGED HEART AND MEDIASTINUM: Unremarkable. LIVER: No evidence of mass. GALLBLADDER: Nondistended. SPLEEN: Upper normal size. PANCREAS: No inflammation. ADRENALS: Unremarkable. KIDNEYS/URETERS: Tiny 1 mm calculi are in the right kidney. No left nephrolithiasis. No hydronephrosis. No ureteral calculus. STOMACH: Partially distended with food. SMALL BOWEL: No obstruction. COLON: Mild colonic fecal stasis. APPENDIX: Unremarkable. PERITONEUM: No ascites or pneumoperitoneum. RETROPERITONEUM: No lymphadenopathy or aortic aneurysm. REPRODUCTIVE ORGANS: Uterus and ovaries are within normal limits. URINARY BLADDER: No mass or calculus. BONES: No destructive bone lesion. ADDITIONAL COMMENTS: N/A     IMPRESSION: Nonobstructing right nephrolithiasis. Mild colonic fecal stasis.

## 2017-07-21 LAB
BACTERIA SPEC CULT: NORMAL
CC UR VC: NORMAL
SERVICE CMNT-IMP: NORMAL

## 2017-08-19 ENCOUNTER — HOSPITAL ENCOUNTER (EMERGENCY)
Age: 27
Discharge: HOME | End: 2017-08-19
Attending: EMERGENCY MEDICINE
Payer: OTHER GOVERNMENT

## 2017-08-19 ENCOUNTER — APPOINTMENT (OUTPATIENT)
Dept: CT IMAGING | Age: 27
End: 2017-08-19
Attending: EMERGENCY MEDICINE
Payer: OTHER GOVERNMENT

## 2017-08-19 VITALS
BODY MASS INDEX: 23.79 KG/M2 | TEMPERATURE: 98.4 F | DIASTOLIC BLOOD PRESSURE: 65 MMHG | RESPIRATION RATE: 16 BRPM | HEART RATE: 86 BPM | HEIGHT: 63 IN | OXYGEN SATURATION: 97 % | WEIGHT: 134.25 LBS | SYSTOLIC BLOOD PRESSURE: 107 MMHG

## 2017-08-19 DIAGNOSIS — G43.109 MIGRAINE WITH AURA AND WITHOUT STATUS MIGRAINOSUS, NOT INTRACTABLE: Primary | ICD-10-CM

## 2017-08-19 LAB
ALBUMIN SERPL-MCNC: 4.1 G/DL (ref 3.5–5)
ALBUMIN/GLOB SERPL: 1.3 {RATIO} (ref 1.1–2.2)
ALP SERPL-CCNC: 44 U/L (ref 45–117)
ALT SERPL-CCNC: 22 U/L (ref 12–78)
ANION GAP SERPL CALC-SCNC: 10 MMOL/L (ref 5–15)
AST SERPL-CCNC: 8 U/L (ref 15–37)
BASOPHILS # BLD: 0 K/UL (ref 0–0.1)
BASOPHILS NFR BLD: 0 % (ref 0–1)
BILIRUB SERPL-MCNC: 0.3 MG/DL (ref 0.2–1)
BUN SERPL-MCNC: 11 MG/DL (ref 6–20)
BUN/CREAT SERPL: 15 (ref 12–20)
CALCIUM SERPL-MCNC: 8.9 MG/DL (ref 8.5–10.1)
CHLORIDE SERPL-SCNC: 103 MMOL/L (ref 97–108)
CO2 SERPL-SCNC: 27 MMOL/L (ref 21–32)
CREAT SERPL-MCNC: 0.72 MG/DL (ref 0.55–1.02)
CRP SERPL-MCNC: <0.29 MG/DL (ref 0–0.6)
EOSINOPHIL # BLD: 0.3 K/UL (ref 0–0.4)
EOSINOPHIL NFR BLD: 3 % (ref 0–7)
ERYTHROCYTE [DISTWIDTH] IN BLOOD BY AUTOMATED COUNT: 12.4 % (ref 11.5–14.5)
ERYTHROCYTE [SEDIMENTATION RATE] IN BLOOD: 3 MM/HR (ref 0–20)
GLOBULIN SER CALC-MCNC: 3.2 G/DL (ref 2–4)
GLUCOSE SERPL-MCNC: 99 MG/DL (ref 65–100)
HCG UR QL: NEGATIVE
HCT VFR BLD AUTO: 38.7 % (ref 35–47)
HGB BLD-MCNC: 13.3 G/DL (ref 11.5–16)
LYMPHOCYTES # BLD: 2 K/UL (ref 0.8–3.5)
LYMPHOCYTES NFR BLD: 24 % (ref 12–49)
MCH RBC QN AUTO: 30 PG (ref 26–34)
MCHC RBC AUTO-ENTMCNC: 34.4 G/DL (ref 30–36.5)
MCV RBC AUTO: 87.4 FL (ref 80–99)
MONOCYTES # BLD: 0.5 K/UL (ref 0–1)
MONOCYTES NFR BLD: 6 % (ref 5–13)
NEUTS SEG # BLD: 5.6 K/UL (ref 1.8–8)
NEUTS SEG NFR BLD: 67 % (ref 32–75)
PLATELET # BLD AUTO: 225 K/UL (ref 150–400)
POTASSIUM SERPL-SCNC: 3.7 MMOL/L (ref 3.5–5.1)
PROT SERPL-MCNC: 7.3 G/DL (ref 6.4–8.2)
RBC # BLD AUTO: 4.43 M/UL (ref 3.8–5.2)
SODIUM SERPL-SCNC: 140 MMOL/L (ref 136–145)
WBC # BLD AUTO: 8.3 K/UL (ref 3.6–11)

## 2017-08-19 PROCEDURE — 81025 URINE PREGNANCY TEST: CPT

## 2017-08-19 PROCEDURE — 86140 C-REACTIVE PROTEIN: CPT | Performed by: NURSE PRACTITIONER

## 2017-08-19 PROCEDURE — 96374 THER/PROPH/DIAG INJ IV PUSH: CPT

## 2017-08-19 PROCEDURE — 80053 COMPREHEN METABOLIC PANEL: CPT | Performed by: NURSE PRACTITIONER

## 2017-08-19 PROCEDURE — 70450 CT HEAD/BRAIN W/O DYE: CPT

## 2017-08-19 PROCEDURE — 85025 COMPLETE CBC W/AUTO DIFF WBC: CPT | Performed by: NURSE PRACTITIONER

## 2017-08-19 PROCEDURE — 96375 TX/PRO/DX INJ NEW DRUG ADDON: CPT

## 2017-08-19 PROCEDURE — 99285 EMERGENCY DEPT VISIT HI MDM: CPT

## 2017-08-19 PROCEDURE — 85652 RBC SED RATE AUTOMATED: CPT | Performed by: NURSE PRACTITIONER

## 2017-08-19 PROCEDURE — 74011250636 HC RX REV CODE- 250/636: Performed by: NURSE PRACTITIONER

## 2017-08-19 RX ORDER — DIPHENHYDRAMINE HYDROCHLORIDE 50 MG/ML
25 INJECTION, SOLUTION INTRAMUSCULAR; INTRAVENOUS
Status: COMPLETED | OUTPATIENT
Start: 2017-08-19 | End: 2017-08-19

## 2017-08-19 RX ORDER — ONDANSETRON 4 MG/1
4 TABLET, ORALLY DISINTEGRATING ORAL
Qty: 10 TAB | Refills: 0 | Status: SHIPPED | OUTPATIENT
Start: 2017-08-19 | End: 2017-12-18

## 2017-08-19 RX ORDER — ELETRIPTAN HYDROBROMIDE 20 MG/1
20 TABLET, FILM COATED ORAL
Qty: 8 TAB | Refills: 0 | Status: SHIPPED | OUTPATIENT
Start: 2017-08-19 | End: 2018-04-04

## 2017-08-19 RX ORDER — PROCHLORPERAZINE EDISYLATE 5 MG/ML
10 INJECTION INTRAMUSCULAR; INTRAVENOUS
Status: COMPLETED | OUTPATIENT
Start: 2017-08-19 | End: 2017-08-19

## 2017-08-19 RX ADMIN — METHYLPREDNISOLONE SODIUM SUCCINATE 40 MG: 40 INJECTION, POWDER, FOR SOLUTION INTRAMUSCULAR; INTRAVENOUS at 22:20

## 2017-08-19 RX ADMIN — PROCHLORPERAZINE EDISYLATE 10 MG: 5 INJECTION INTRAMUSCULAR; INTRAVENOUS at 22:16

## 2017-08-19 RX ADMIN — DIPHENHYDRAMINE HYDROCHLORIDE 25 MG: 50 INJECTION, SOLUTION INTRAMUSCULAR; INTRAVENOUS at 22:14

## 2017-08-19 NOTE — ED TRIAGE NOTES
Triage Note: Patient reports she woke up with a migraine headache and aura. Patient reports 2 hours PTA she developed nausea, right sided facial numbness, blurry vision, period of slurred speech an confusion. Hx of migraines. Patient A & O x 4 in triage. No slurred speech noted. Spoke to Dr. Miguel Lyn regarding patient symptoms.  Verbal order received for no Code S.

## 2017-08-20 NOTE — ED PROVIDER NOTES
HPI Comments: 24 y.o. female with past medical history significant for chronic migraine who presents with chief complaint of headache. Patient complains of a migraine headache and aura that are typical for her HA/ aura when she awoke today. Patient also complains of nausea, right sided facial numbness, blurry vision, and some brief slurred speech and confusion. Patient's speech and confusion have since improved and she states these symptoms occurred hours ago. Patient was seen by neurology for the same complaint in the past and has had a MRI done within the last 4 months without any remarkable findings. Patient is out of Relpax, which she states typically provides her migraine relief. Patient was able to work today without difficulty with aforementioned symptoms. Patient is not concerned for pregnancy. There are no other acute medical concerns at this time. Social hx: never smoker, occasional alcohol drinker, no drug abuse history   PCP: Anahy Sams MD    Note written by Reno Reilly, as dictated by Justin Garcia NP 3:15 AM     The history is provided by the patient. No  was used. Past Medical History:   Diagnosis Date    Hyperprolactinemia ([address]     Ill-defined condition     kidney infection     Migraines     Neurological disorder     chronic migraine with aura        Past Surgical History:   Procedure Laterality Date    HX WISDOM TEETH EXTRACTION           Family History:   Problem Relation Age of Onset    Headache Mother        Social History     Social History    Marital status: SINGLE     Spouse name: N/A    Number of children: N/A    Years of education: N/A     Occupational History    Not on file. Social History Main Topics    Smoking status: Never Smoker    Smokeless tobacco: Never Used    Alcohol use Yes      Comment:  Occ    Drug use: No    Sexual activity: Not on file     Other Topics Concern    Not on file     Social History Narrative    ** Merged History Encounter **              ALLERGIES: Other food and Imitrex [sumatriptan succinate]    Review of Systems   Constitutional: Negative for appetite change, chills, fatigue and fever. HENT: Negative for congestion, rhinorrhea and sore throat. Eyes: Positive for visual disturbance. Respiratory: Negative for cough and shortness of breath. Cardiovascular: Negative for chest pain and leg swelling. Gastrointestinal: Positive for nausea. Negative for abdominal pain, constipation, diarrhea and vomiting. Genitourinary: Negative for difficulty urinating and dysuria. Musculoskeletal: Negative for back pain and neck pain. Skin: Negative for rash and wound. Neurological: Positive for numbness and headaches. All other systems reviewed and are negative. Vitals:    08/19/17 2130 08/19/17 2215 08/19/17 2245 08/19/17 2300   BP: 107/66 112/69 110/68 107/65   Pulse:    86   Resp: 16 16 16 16   Temp:    98.4 °F (36.9 °C)   SpO2: 100% 99% 98% 97%   Weight:       Height:                Physical Exam   Constitutional: She is oriented to person, place, and time. She appears well-developed and well-nourished. No distress. HENT:   Head: Normocephalic and atraumatic. Right Ear: External ear normal.   Left Ear: External ear normal.   Nose: Nose normal.   Mouth/Throat: Oropharynx is clear and moist.   Eyes: EOM are normal. Pupils are equal, round, and reactive to light. Right eye exhibits no discharge. Left eye exhibits no discharge. Right conjunctiva is injected. Left conjunctiva is injected. Clear tearing present BL eyes    Neck: Normal range of motion. Neck supple. Cardiovascular: Normal rate, regular rhythm, normal heart sounds and intact distal pulses. Pulmonary/Chest: Effort normal and breath sounds normal.   Abdominal: Soft. Bowel sounds are normal. She exhibits no distension. There is no tenderness. There is no rebound and no guarding. Musculoskeletal: Normal range of motion.  She exhibits no edema or tenderness. Neurological: She is alert and oriented to person, place, and time. She has normal strength. No cranial nerve deficit or sensory deficit. She displays a negative Romberg sign. Coordination normal. GCS eye subscore is 4. GCS verbal subscore is 5. GCS motor subscore is 6.   5/5 BLLE and BL UE strength, pt speaking fluidly- no speech deficit present; no facial droop    Skin: Skin is warm and dry. No rash noted. Psychiatric: She has a normal mood and affect. Her behavior is normal. Judgment and thought content normal.   Nursing note and vitals reviewed. Note written by Reno Jordan, as dictated by Liya Luque NP 3:19 AM      MDM  Number of Diagnoses or Management Options  Migraine with aura and without status migrainosus, not intractable:   Diagnosis management comments: DDx: cluster HA, migraine HA, arteritis     25 yo F presents with c/o atypical HA with hx of migraine. Pt states that she this is different for her, on review of medical record, pt has similar HA with similar s/sx numerous times in the past. Her pain subsided with medication in the ED. There were no emergent lab/ diagnostic findings. She had no focal neuro deficits on her PE. She was advised to f/u with Neuro ASAP for her migraine management. She states understanding.         Amount and/or Complexity of Data Reviewed  Clinical lab tests: ordered and reviewed  Tests in the radiology section of CPT®: ordered and reviewed  Review and summarize past medical records: yes      ED Course       Procedures    LABORATORY TESTS:  Recent Results (from the past 12 hour(s))   C REACTIVE PROTEIN, QT    Collection Time: 08/19/17  8:16 PM   Result Value Ref Range    C-Reactive protein <0.29 0.00 - 0.60 mg/dL   CBC WITH AUTOMATED DIFF    Collection Time: 08/19/17  8:16 PM   Result Value Ref Range    WBC 8.3 3.6 - 11.0 K/uL    RBC 4.43 3.80 - 5.20 M/uL    HGB 13.3 11.5 - 16.0 g/dL    HCT 38.7 35.0 - 47.0 %    MCV 87.4 80.0 - 99.0 FL    MCH 30.0 26.0 - 34.0 PG    MCHC 34.4 30.0 - 36.5 g/dL    RDW 12.4 11.5 - 14.5 %    PLATELET 248 105 - 406 K/uL    NEUTROPHILS 67 32 - 75 %    LYMPHOCYTES 24 12 - 49 %    MONOCYTES 6 5 - 13 %    EOSINOPHILS 3 0 - 7 %    BASOPHILS 0 0 - 1 %    ABS. NEUTROPHILS 5.6 1.8 - 8.0 K/UL    ABS. LYMPHOCYTES 2.0 0.8 - 3.5 K/UL    ABS. MONOCYTES 0.5 0.0 - 1.0 K/UL    ABS. EOSINOPHILS 0.3 0.0 - 0.4 K/UL    ABS. BASOPHILS 0.0 0.0 - 0.1 K/UL   METABOLIC PANEL, COMPREHENSIVE    Collection Time: 08/19/17  8:16 PM   Result Value Ref Range    Sodium 140 136 - 145 mmol/L    Potassium 3.7 3.5 - 5.1 mmol/L    Chloride 103 97 - 108 mmol/L    CO2 27 21 - 32 mmol/L    Anion gap 10 5 - 15 mmol/L    Glucose 99 65 - 100 mg/dL    BUN 11 6 - 20 MG/DL    Creatinine 0.72 0.55 - 1.02 MG/DL    BUN/Creatinine ratio 15 12 - 20      GFR est AA >60 >60 ml/min/1.73m2    GFR est non-AA >60 >60 ml/min/1.73m2    Calcium 8.9 8.5 - 10.1 MG/DL    Bilirubin, total 0.3 0.2 - 1.0 MG/DL    ALT (SGPT) 22 12 - 78 U/L    AST (SGOT) 8 (L) 15 - 37 U/L    Alk. phosphatase 44 (L) 45 - 117 U/L    Protein, total 7.3 6.4 - 8.2 g/dL    Albumin 4.1 3.5 - 5.0 g/dL    Globulin 3.2 2.0 - 4.0 g/dL    A-G Ratio 1.3 1.1 - 2.2     SED RATE (ESR)    Collection Time: 08/19/17  8:16 PM   Result Value Ref Range    Sed rate, automated 3 0 - 20 mm/hr   HCG URINE, QL. - POC    Collection Time: 08/19/17 10:19 PM   Result Value Ref Range    Pregnancy test,urine (POC) NEGATIVE  NEG         IMAGING RESULTS:  CT HEAD WO CONT   Final Result      EXAM:  CT HEAD WO CONT     INDICATION:   headache, blurry vision     COMPARISON: 4/13/2017.     CONTRAST:  None.     TECHNIQUE: Unenhanced CT of the head was performed using 5 mm images. Brain and  bone windows were generated.   CT dose reduction was achieved through use of a  standardized protocol tailored for this examination and automatic exposure  control for dose modulation.       FINDINGS:  The ventricles and sulci are normal in size, shape and configuration and  midline. There is no significant white matter disease. There is no intracranial  hemorrhage, extra-axial collection, mass, mass effect or midline shift. The  basilar cisterns are open. No acute infarct is identified. The bone windows  demonstrate no abnormalities. Mild mucoperiosteal thickening is noted in the  left maxillary sinus.     IMPRESSION  IMPRESSION: No acute process or change compared to the prior exam.       MEDICATIONS GIVEN:  Medications   methylPREDNISolone (PF) (SOLU-MEDROL) injection 40 mg (40 mg IntraVENous Given 8/19/17 2220)   prochlorperazine (COMPAZINE) injection 10 mg (10 mg IntraVENous Given 8/19/17 2216)   diphenhydrAMINE (BENADRYL) injection 25 mg (25 mg IntraVENous Given 8/19/17 2214)       IMPRESSION:  1. Migraine with aura and without status migrainosus, not intractable        PLAN:  1. Discharge Medication List as of 8/19/2017 10:55 PM      START taking these medications    Details   eletriptan (RELPAX) 20 mg tablet Take 1 Tab by mouth once as needed for Migraine for up to 1 dose. may repeat in 2 hours if necessary, Print, Disp-8 Tab, R-0      ondansetron (ZOFRAN ODT) 4 mg disintegrating tablet Take 1 Tab by mouth every eight (8) hours as needed for Nausea. , Print, Disp-10 Tab, R-0         CONTINUE these medications which have NOT CHANGED    Details   naproxen (NAPROSYN) 500 mg tablet Take 1 Tab by mouth every twelve (12) hours as needed for Pain., Print, Disp-20 Tab, R-0      polyethylene glycol (MIRALAX) 17 gram/dose powder Take 17 g by mouth daily. 1 tablespoon with 8 oz of water daily, Print, Disp-238 g, R-0      sucralfate (CARAFATE) 1 gram tablet Take 1 Tab by mouth four (4) times daily. , Print, Disp-30 Tab, R-0      omeprazole (PRILOSEC) 20 mg capsule Take 1 Cap by mouth daily. , Print, Disp-20 Cap, R-0      FEXOFENADINE HCL (ALLEGRA PO) Take  by mouth., Historical Med      nortriptyline (PAMELOR) 25 mg capsule Take 1 Cap by mouth nightly. , Normal, Disp-30 Cap, R-1         STOP taking these medications       cefdinir (OMNICEF) 300 mg capsule Comments:   Reason for Stopping:         HYDROcodone-acetaminophen (NORCO) 7.5-325 mg per tablet Comments:   Reason for Stopping:         methylPREDNISolone (MEDROL DOSEPACK) 4 mg tablet Comments:   Reason for Stoppin.   Follow-up Information     Follow up With Details Comments Contact Info    Nadiya Omalley MD Schedule an appointment as soon as possible for a visit  [address] [address]  [address] [place], [place] [address]e [facility] 9[address]      [facility] EMERGENCY DEP Go to As needed, If symptoms worsen g [address] [id number]  [address] [place] [address],  Schedule an appointment as soon as possible for a visit  [address] [facility] at [address] [facility].  [phone]          3. Return to ED if worse     Discharge Note:    The patient is ready for discharge. The patient's signs, symptoms, diagnosis, and discharge instruction have been discussed and the patient has conveyed their understanding. The patient is to follow up as recommended or return to the ER should their symptoms worsen. Plan has been discussed and the patient is in agreement.     Janice Carrion NP fever/CHILLS/DYSURIA/PAINFUL URINATION

## 2017-08-20 NOTE — ED NOTES
Pt shivering. Checked temperature and wnl. Provided pt with warm blankets. Lights dimmed for comfort call bell within reach.

## 2017-08-20 NOTE — ED NOTES
Pt resting on stretcher with lights dimmed. Pt states that numbness on face has resolved and headache has diminished to 2/10. Call bell within reach. Will continue to monitor.

## 2017-08-20 NOTE — DISCHARGE INSTRUCTIONS
We hope that we have addressed all of your medical concerns. The examination and treatment you received in the Emergency Department were for an emergent problem and were not intended as complete care. It is important that you follow up with your healthcare provider(s) for ongoing care. If your symptoms worsen or do not improve as expected, and you are unable to reach your usual health care provider(s), you should return to the Emergency Department. Today's healthcare is undergoing tremendous change, and patient satisfaction surveys are one of the many tools to assess the quality of medical care. You may receive a survey from the [employer] regarding your experience in the Emergency Department. I hope that your experience has been completely positive, particularly the medical care that I provided. As such, please participate in the survey; anything less than excellent does not meet my expectations or intentions. [facility] and 8 [facility] participate in nationally recognized quality of care measures. If your blood pressure is greater than 120/80, as reported below, we urge that you seek medical care to address the potential of high blood pressure, commonly known as hypertension. Hypertension can be hereditary or can be caused by certain medical conditions, pain, stress, or \"white coat syndrome. \"       Please make an appointment with your health care provider(s) for follow up of your Emergency Department visit. VITALS:   Patient Vitals for the past 8 hrs:   Temp Pulse Resp BP SpO2   08/19/17 2245 - - 16 110/68 98 %   08/19/17 2215 - - 16 112/69 99 %   08/19/17 2130 - - 16 107/66 100 %   08/19/17 2109 98 °F (36.7 °C) 70 16 120/74 99 %   08/19/17 1941 98.1 °F (36.7 °C) 67 16 124/84 100 %          Thank you for allowing us to provide you with medical care today. We realize that you have many choices for your emergency care needs.   Please choose us in the future for any continued health care needs. Regards,                Recurring Migraine Headache: Care Instructions  Your Care Instructions  Migraines are painful, throbbing headaches. They often start on one side of the head. They may cause nausea and vomiting and make you sensitive to light, sound, or smell. Some people may have only a few migraines throughout life. Others have them as often as several times a month. The goal of treatment is to reduce the number of migraines you have and relieve your symptoms. Even with treatment, you may continue to have migraines. You play an important role in dealing with your headaches. Work on avoiding things that seem to trigger your migraines. When you feel a headache coming on, act quickly to stop it before it gets worse. Follow-up care is a key part of your treatment and safety. Be sure to make and go to all appointments, and call your doctor if you are having problems. It's also a good idea to know your test results and keep a list of the medicines you take. How can you care for yourself at home? · Do not drive if you have taken a prescription pain medicine. · Rest in a quiet, dark room until your headache is gone. Close your eyes and try to relax or go to sleep. Do not watch TV or read. · Put a cold, moist cloth or cold pack on the painful area for 10 to 20 minutes at a time. Put a thin cloth between the cold pack and your skin. · Have someone gently massage your neck and shoulders. · Take your medicines exactly as prescribed. Call your doctor if you think you are having a problem with your medicine. You will get more details on the specific medicines your doctor prescribes. To prevent migraines  · Keep a headache diary so you can figure out what triggers your headaches. Avoiding triggers may help you prevent headaches. Record when each headache began, how long it lasted, and what the pain was like.  Use words like throbbing, aching, stabbing, or dull. Write down any other symptoms you had with the headache. These may include nausea, flashing lights or dark spots, or sensitivity to bright light or loud noise. Note if the headache occurred near your period. List anything that might have triggered the headache. Triggers may include certain foods (chocolate, cheese, wine) or odors, smoke, bright light, stress, or lack of sleep. · If your doctor has prescribed medicine for your migraines, take it as directed. You may have medicine that you take only when you get a migraine and medicine that you take all the time to help prevent migraines. ¨ If your doctor has prescribed medicine for when you get a headache, take it at the first sign of a migraine, unless your doctor has given you other instructions. ¨ If your doctor has prescribed medicine to prevent migraines, take it exactly as prescribed. Call your doctor if you think you are having a problem with your medicine. · Find healthy ways to deal with stress. Migraines are most common during or right after stressful times. Take time to relax before and after you do something that has caused a migraine in the past.  · Try to keep your muscles relaxed by keeping good posture. Check your jaw, face, neck, and shoulder muscles for tension. Try to relax them. When sitting at a desk, change positions often. Stretch for 30 seconds each hour. · Get regular sleep and exercise. · Eat regular meals, and avoid foods and drinks that often trigger migraines. These include chocolate and alcohol, especially red wine and port. Chemicals used in food, such as aspartame and monosodium glutamate (MSG), also can trigger migraines. So can some food additives, such as those found in hot dogs, vallecillo, cold cuts, aged cheeses, and pickled foods. · Limit caffeine by not drinking too much coffee, tea, or soda. Do not quit caffeine suddenly, because that can also give you migraines.   · Do not smoke or allow others to smoke around you. If you need help quitting, talk to your doctor about stop-smoking programs and medicines. These can increase your chances of quitting for good. · If you are taking birth control pills or hormone therapy, talk to your doctor about whether they are triggering your migraines. When should you call for help? Call 911 anytime you think you may need emergency care. For example, call if:  · You have symptoms of a stroke. These may include:  ¨ Sudden numbness, tingling, weakness, or loss of movement in your face, arm, or leg, especially on only one side of your body. ¨ Sudden vision changes. ¨ Sudden trouble speaking. ¨ Sudden confusion or trouble understanding simple statements. ¨ Sudden problems with walking or balance. ¨ A sudden, severe headache that is different from past headaches. Call your doctor now or seek immediate medical care if:  · You develop a fever and a stiff neck. · You have new nausea and vomiting, or you cannot keep down food or liquids. Watch closely for changes in your health, and be sure to contact your doctor if:  · You have a headache that does not get better within 1 or 2 days. · Your headaches get worse or happen more often. Where can you learn more? Go to http://jadyn-dannie.info/. Enter V975 in the search box to learn more about \"Recurring Migraine Headache: Care Instructions. \"  Current as of: October 14, 2016  Content Version: 11.3  © 0535-6005 [employer]. Care instructions adapted under license by [employer] (which disclaims liability or warranty for this information). If you have questions about a medical condition or this instruction, always ask your healthcare professional. [place] [address] any warranty or liability for your use of this information.     [address]: [phone]            Recent Results (from the past 24 hour(s))   C REACTIVE PROTEIN, QT    Collection Time: 08/19/17  8:16 PM   Result Value Ref Range    C-Reactive protein <0.29 0.00 - 0.60 mg/dL   CBC WITH AUTOMATED DIFF    Collection Time: 08/19/17  8:16 PM   Result Value Ref Range    WBC 8.3 3.6 - 11.0 K/uL    RBC 4.43 3.80 - 5.20 M/uL    HGB 13.3 11.5 - 16.0 g/dL    HCT 38.7 35.0 - 47.0 %    MCV 87.4 80.0 - 99.0 FL    MCH 30.0 26.0 - 34.0 PG    MCHC 34.4 30.0 - 36.5 g/dL    RDW 12.4 11.5 - 14.5 %    PLATELET 242 764 - 923 K/uL    NEUTROPHILS 67 32 - 75 %    LYMPHOCYTES 24 12 - 49 %    MONOCYTES 6 5 - 13 %    EOSINOPHILS 3 0 - 7 %    BASOPHILS 0 0 - 1 %    ABS. NEUTROPHILS 5.6 1.8 - 8.0 K/UL    ABS. LYMPHOCYTES 2.0 0.8 - 3.5 K/UL    ABS. MONOCYTES 0.5 0.0 - 1.0 K/UL    ABS. EOSINOPHILS 0.3 0.0 - 0.4 K/UL    ABS. BASOPHILS 0.0 0.0 - 0.1 K/UL   METABOLIC PANEL, COMPREHENSIVE    Collection Time: 08/19/17  8:16 PM   Result Value Ref Range    Sodium 140 136 - 145 mmol/L    Potassium 3.7 3.5 - 5.1 mmol/L    Chloride 103 97 - 108 mmol/L    CO2 27 21 - 32 mmol/L    Anion gap 10 5 - 15 mmol/L    Glucose 99 65 - 100 mg/dL    BUN 11 6 - 20 MG/DL    Creatinine 0.72 0.55 - 1.02 MG/DL    BUN/Creatinine ratio 15 12 - 20      GFR est AA >60 >60 ml/min/1.73m2    GFR est non-AA >60 >60 ml/min/1.73m2    Calcium 8.9 8.5 - 10.1 MG/DL    Bilirubin, total 0.3 0.2 - 1.0 MG/DL    ALT (SGPT) 22 12 - 78 U/L    AST (SGOT) 8 (L) 15 - 37 U/L    Alk. phosphatase 44 (L) 45 - 117 U/L    Protein, total 7.3 6.4 - 8.2 g/dL    Albumin 4.1 3.5 - 5.0 g/dL    Globulin 3.2 2.0 - 4.0 g/dL    A-G Ratio 1.3 1.1 - 2.2     SED RATE (ESR)    Collection Time: 08/19/17  8:16 PM   Result Value Ref Range    Sed rate, automated 3 0 - 20 mm/hr   HCG URINE, QL. - POC    Collection Time: 08/19/17 10:19 PM   Result Value Ref Range    Pregnancy test,urine (POC) NEGATIVE  NEG         Ct Head Wo Cont    Result Date: 8/19/2017  EXAM:  CT HEAD WO CONT INDICATION:   headache, blurry vision COMPARISON: 4/13/2017. CONTRAST:  None.  TECHNIQUE: Unenhanced CT of the head was performed using 5 mm images. Brain and bone windows were generated. CT dose reduction was achieved through use of a standardized protocol tailored for this examination and automatic exposure control for dose modulation. FINDINGS: The ventricles and sulci are normal in size, shape and configuration and midline. There is no significant white matter disease. There is no intracranial hemorrhage, extra-axial collection, mass, mass effect or midline shift. The basilar cisterns are open. No acute infarct is identified. The bone windows demonstrate no abnormalities. Mild mucoperiosteal thickening is noted in the left maxillary sinus.      IMPRESSION: No acute process or change compared to the prior exam.

## 2017-10-19 ENCOUNTER — APPOINTMENT (OUTPATIENT)
Dept: GENERAL RADIOLOGY | Age: 27
End: 2017-10-19
Attending: EMERGENCY MEDICINE
Payer: OTHER GOVERNMENT

## 2017-10-19 ENCOUNTER — HOSPITAL ENCOUNTER (EMERGENCY)
Age: 27
Discharge: HOME | End: 2017-10-19
Attending: EMERGENCY MEDICINE
Payer: OTHER GOVERNMENT

## 2017-10-19 VITALS
TEMPERATURE: 98 F | SYSTOLIC BLOOD PRESSURE: 110 MMHG | BODY MASS INDEX: 23.77 KG/M2 | WEIGHT: 134.13 LBS | RESPIRATION RATE: 16 BRPM | OXYGEN SATURATION: 100 % | DIASTOLIC BLOOD PRESSURE: 68 MMHG | HEART RATE: 89 BPM | HEIGHT: 63 IN

## 2017-10-19 DIAGNOSIS — M25.532 LEFT WRIST PAIN: Primary | ICD-10-CM

## 2017-10-19 LAB — HCG UR QL: NEGATIVE

## 2017-10-19 PROCEDURE — 81025 URINE PREGNANCY TEST: CPT

## 2017-10-19 PROCEDURE — 73090 X-RAY EXAM OF FOREARM: CPT

## 2017-10-19 PROCEDURE — 74011250637 HC RX REV CODE- 250/637: Performed by: EMERGENCY MEDICINE

## 2017-10-19 PROCEDURE — 99283 EMERGENCY DEPT VISIT LOW MDM: CPT

## 2017-10-19 RX ORDER — IBUPROFEN 400 MG/1
400 TABLET ORAL
Status: COMPLETED | OUTPATIENT
Start: 2017-10-19 | End: 2017-10-19

## 2017-10-19 RX ADMIN — IBUPROFEN 400 MG: 400 TABLET, FILM COATED ORAL at 19:56

## 2017-10-19 NOTE — DISCHARGE INSTRUCTIONS
Arm Pain: Care Instructions  Your Care Instructions  You can hurt your arm by using it too much or by injuring it. Biking, wrestling, and home repair projects are examples of activities that can lead to arm pain. Everyday wear and tear, especially as you get older, can cause arm pain. Your forearms, wrists, hands, and fingers are the parts of your arm that are most likely to become painful. A minor arm injury usually will heal on its own with home treatment to relieve swelling and pain. If you have a more serious injury, you may need tests and treatment. Follow-up care is a key part of your treatment and safety. Be sure to make and go to all appointments, and call your doctor if you are having problems. Its also a good idea to know your test results and keep a list of the medicines you take. How can you care for yourself at home? · Take pain medicines exactly as directed. ¨ If the doctor gave you a prescription medicine for pain, take it as prescribed. ¨ If you are not taking a prescription pain medicine, ask your doctor if you can take an over-the-counter medicine. · Rest and protect your arm. Take a break from any activity that may cause pain. · Put ice or a cold pack on your arm for 10 to 20 minutes at a time. Put a thin cloth between the ice and your skin. · Prop up the sore arm on a pillow when you ice it or anytime you sit or lie down during the next 3 days. Try to keep it above the level of your heart. This will help reduce swelling. · If your doctor recommends a sling to support your arm, wear it as directed. When should you call for help? Call 911 anytime you think you may need emergency care. For example, call if:  · Your arm or hand is cool or pale or changes color. Call your doctor now or seek immediate medical care if:  · You cannot use your arm. · You have signs of infection, such as:  ¨ Increased pain, swelling, warmth, or redness. ¨ Red streaks running up or down your arm.   ¨ Pus draining from an area of your arm. ¨ A fever. · You have tingling, weakness, or numbness in your arm. Watch closely for changes in your health, and be sure to contact your doctor if:  · You do not get better as expected. Where can you learn more? Go to http://jadyn-dannie.info/. Enter B641 in the search box to learn more about \"Arm Pain: Care Instructions. \"  Current as of: March 20, 2017  Content Version: 11.3  © 9029-3252 [employer]. Care instructions adapted under license by [email or web address] (which disclaims liability or warranty for this information). If you have questions about a medical condition or this instruction, always ask your healthcare professional. [address] any warranty or liability for your use of this information.

## 2017-10-19 NOTE — ED TRIAGE NOTES
Pt c/o left wrist swelling since this am, pain started yesterday, denies injury , no deformity noted, scant amt swelling noted

## 2017-10-19 NOTE — ED PROVIDER NOTES
Patient is a 24 y.o. female presenting with wrist pain. Wrist Pain           27y R-handed F here with L distal forearm pain. Started last night. No injury or trauma. Hurts to touch or to move. No fever. No redness. She reports a small amount of swelling. Has been applying ice which seems to help with the pain. No rash. No hx of similar. No weakness or numbness. No sx's in the hand. No elbow pain. Past Medical History:   Diagnosis Date    Hyperprolactinemia ([facility] [address]     Ill-defined condition     kidney infection     Migraines     Neurological disorder     chronic migraine with aura        Past Surgical History:   Procedure Laterality Date    HX WISDOM TEETH EXTRACTION           Family History:   Problem Relation Age of Onset    Headache Mother        Social History     Social History    Marital status: SINGLE     Spouse name: N/A    Number of children: N/A    Years of education: N/A     Occupational History    Not on file. Social History Main Topics    Smoking status: Never Smoker    Smokeless tobacco: Never Used    Alcohol use Yes      Comment: Occ    Drug use: No    Sexual activity: Not on file     Other Topics Concern    Not on file     Social History Narrative    ** Merged History Encounter **              ALLERGIES: Other food and Imitrex [sumatriptan succinate]    Review of Systems   Review of Systems   Constitutional: (-) weight loss. HEENT: (-) stiff neck   Eyes: (-) discharge. Respiratory: (-) for cough. Cardiovascular: (-) syncope. Gastrointestinal: (-) blood in stool. Genitourinary: (-) hematuria. Musculoskeletal: (-) myalgias. Neurological: (-) seizure. Skin: (-) petechiae  Lymph/Immunologic: (-) enlarged lymph nodes  All other systems reviewed and are negative.         Vitals:    10/19/17 1833   BP: 108/67   Pulse: 93   Resp: 16   Temp: 97.7 °F (36.5 °C)   SpO2: 100%   Weight: 60.8 kg (134 lb 2 oz)   Height: 5' 3\" (1.6 m)            Physical Exam Nursing note and vitals reviewed. Constitutional: oriented to person, place, and time. appears well-developed and well-nourished. No distress. Head: Normocephalic and atraumatic. Nose: No rhinorrhea  Mouth/Throat: Oropharynx is clear and moist.  Eyes: Conjunctivae are normal.  Neck: Painless normal range of motion. Cardiovascular: Normal rate  Pulmonary/Chest:  No respiratory distress  Extremities/Musculoskeletal: Normal range of motion. Tender to palpation along the distal ulna. No swelling. No redness. No edema. Distal extremities are neurovasc intact. Neurological:  Alert and oriented to person, place, and time. Coordination normal. CN 2-12 intact. Motor and sensory function intact. Skin: Skin is warm and dry. No rash noted. No pallor. MDM 27y F here with L distal forearm pain. Atraumatic. No deformities.  Will check xray and reeval.    ED Course       Procedures

## 2017-10-19 NOTE — LETTER
[address] 
[address] [facility] [place] [facility] [id number] 
[phone] Work/School Note Date: 10/19/2017 To Whom It May concern: 
 
Caroline Prince was seen and treated today in the emergency room by the following provider(s): 
Attending Provider: Katalina Rodriguez MD.   
 
Caroline Prince may return to work on 10/22/2016.  
 
Sincerely, 
 
 
 
 
Kirsty Seymour RN

## 2017-10-20 NOTE — ED NOTES
Patient received discharge instructions by MD and nurse. Patient verbalized understanding of medication use and other discharge instructions. Updated vitals, and patient ambulated out of ED with steady gait, showing no signs of distress. Pt reports relief of most intense pain. All questions answered.

## 2017-12-18 ENCOUNTER — HOSPITAL ENCOUNTER (EMERGENCY)
Age: 27
Discharge: HOME | End: 2017-12-18
Attending: EMERGENCY MEDICINE
Payer: OTHER GOVERNMENT

## 2017-12-18 VITALS
HEIGHT: 63 IN | TEMPERATURE: 98 F | RESPIRATION RATE: 18 BRPM | WEIGHT: 134.6 LBS | HEART RATE: 99 BPM | SYSTOLIC BLOOD PRESSURE: 112 MMHG | BODY MASS INDEX: 23.85 KG/M2 | DIASTOLIC BLOOD PRESSURE: 76 MMHG | OXYGEN SATURATION: 100 %

## 2017-12-18 DIAGNOSIS — R07.9 CHEST PAIN, UNSPECIFIED TYPE: Primary | ICD-10-CM

## 2017-12-18 DIAGNOSIS — K21.9 GASTROESOPHAGEAL REFLUX DISEASE, ESOPHAGITIS PRESENCE NOT SPECIFIED: ICD-10-CM

## 2017-12-18 DIAGNOSIS — I73.00 RAYNAUD'S PHENOMENON WITHOUT GANGRENE: ICD-10-CM

## 2017-12-18 PROCEDURE — 93005 ELECTROCARDIOGRAM TRACING: CPT

## 2017-12-18 PROCEDURE — 99284 EMERGENCY DEPT VISIT MOD MDM: CPT

## 2017-12-18 PROCEDURE — 74011250637 HC RX REV CODE- 250/637: Performed by: EMERGENCY MEDICINE

## 2017-12-18 RX ORDER — FAMOTIDINE 20 MG/1
20 TABLET, FILM COATED ORAL
Status: COMPLETED | OUTPATIENT
Start: 2017-12-18 | End: 2017-12-18

## 2017-12-18 RX ORDER — ONDANSETRON 4 MG/1
4 TABLET, ORALLY DISINTEGRATING ORAL
Status: COMPLETED | OUTPATIENT
Start: 2017-12-18 | End: 2017-12-18

## 2017-12-18 RX ORDER — ONDANSETRON 4 MG/1
4 TABLET, ORALLY DISINTEGRATING ORAL
Qty: 10 TAB | Refills: 0 | Status: SHIPPED | OUTPATIENT
Start: 2017-12-18 | End: 2018-04-04

## 2017-12-18 RX ORDER — OMEPRAZOLE 20 MG/1
20 CAPSULE, DELAYED RELEASE ORAL DAILY
Qty: 20 CAP | Refills: 0 | Status: SHIPPED | OUTPATIENT
Start: 2017-12-18 | End: 2018-04-04

## 2017-12-18 RX ORDER — SUCRALFATE 1 G/1
1 TABLET ORAL 4 TIMES DAILY
Qty: 30 TAB | Refills: 0 | Status: SHIPPED | OUTPATIENT
Start: 2017-12-18 | End: 2018-04-04

## 2017-12-18 RX ORDER — SUCRALFATE 1 G/10ML
2 SUSPENSION ORAL
Status: COMPLETED | OUTPATIENT
Start: 2017-12-18 | End: 2017-12-18

## 2017-12-18 RX ADMIN — FAMOTIDINE 20 MG: 20 TABLET, FILM COATED ORAL at 23:12

## 2017-12-18 RX ADMIN — ONDANSETRON 4 MG: 4 TABLET, ORALLY DISINTEGRATING ORAL at 23:14

## 2017-12-18 RX ADMIN — SUCRALFATE 2 G: 1 SUSPENSION ORAL at 22:04

## 2017-12-19 LAB
ATRIAL RATE: 81 BPM
CALCULATED P AXIS, ECG09: 7 DEGREES
CALCULATED R AXIS, ECG10: 31 DEGREES
CALCULATED T AXIS, ECG11: 19 DEGREES
DIAGNOSIS, 93000: NORMAL
P-R INTERVAL, ECG05: 140 MS
Q-T INTERVAL, ECG07: 376 MS
QRS DURATION, ECG06: 84 MS
QTC CALCULATION (BEZET), ECG08: 436 MS
VENTRICULAR RATE, ECG03: 81 BPM

## 2017-12-19 NOTE — ED NOTES
Patient has received discharge instructions from ER MD, patient refusing to leave, states asked for labwork. Patient yelling at staff, anxious and shaking. ER MD aware and speaking with patient. Patient educated on discharge instructions and medical record release process.   Ambulatory upon discharge

## 2017-12-19 NOTE — ED NOTES
Checked on patient because heart rate was elevated on monitor. Found patient to be hyperventilating. Patient redirected and coached on breathing. Patient breathing in controlled manner.

## 2017-12-19 NOTE — ED TRIAGE NOTES
Pt arrives to ED for c/o intermittent mid-chest pain, lower left arm pain/numbness and N/V for the past couple of days. Pt describes aching pain in center of chest; denies cough/congestion.

## 2017-12-19 NOTE — DISCHARGE INSTRUCTIONS
Chest Pain: Care Instructions  Your Care Instructions    There are many things that can cause chest pain. Some are not serious and will get better on their own in a few days. But some kinds of chest pain need more testing and treatment. Your doctor may have recommended a follow-up visit in the next 8 to 12 hours. If you are not getting better, you may need more tests or treatment. Even though your doctor has released you, you still need to watch for any problems. The doctor carefully checked you, but sometimes problems can develop later. If you have new symptoms or if your symptoms do not get better, get medical care right away. If you have worse or different chest pain or pressure that lasts more than 5 minutes or you passed out (lost consciousness), call 911 or seek other emergency help right away. A medical visit is only one step in your treatment. Even if you feel better, you still need to do what your doctor recommends, such as going to all suggested follow-up appointments and taking medicines exactly as directed. This will help you recover and help prevent future problems. How can you care for yourself at home? · Rest until you feel better. · Take your medicine exactly as prescribed. Call your doctor if you think you are having a problem with your medicine. · Do not drive after taking a prescription pain medicine. When should you call for help? Call 911 if:  ? · You passed out (lost consciousness). ? · You have severe difficulty breathing. ? · You have symptoms of a heart attack. These may include:  ¨ Chest pain or pressure, or a strange feeling in your chest.  ¨ Sweating. ¨ Shortness of breath. ¨ Nausea or vomiting. ¨ Pain, pressure, or a strange feeling in your back, neck, jaw, or upper belly or in one or both shoulders or arms. ¨ Lightheadedness or sudden weakness. ¨ A fast or irregular heartbeat.   After you call 911, the  may tell you to chew 1 adult-strength or 2 to 4 low-dose aspirin. Wait for an ambulance. Do not try to drive yourself. ?Call your doctor today if:  ? · You have any trouble breathing. ? · Your chest pain gets worse. ? · You are dizzy or lightheaded, or you feel like you may faint. ? · You are not getting better as expected. ? · You are having new or different chest pain. Where can you learn more? Go to http://jadyn-dannie.info/. Enter A120 in the search box to learn more about \"Chest Pain: Care Instructions. \"  Current as of: March 20, 2017  Content Version: 11.4  © 0705-2844 [employer]. Care instructions adapted under license by [employer] (which disclaims liability or warranty for this information). If you have questions about a medical condition or this instruction, always ask your healthcare professional. [address] any warranty or liability for your use of this information. Gastroesophageal Reflux Disease (GERD): Care Instructions  Your Care Instructions    Gastroesophageal reflux disease (GERD) is the backward flow of stomach acid into the esophagus. The esophagus is the tube that leads from your throat to your stomach. A one-way valve prevents the stomach acid from moving up into this tube. When you have GERD, this valve does not close tightly enough. If you have mild GERD symptoms including heartburn, you may be able to control the problem with antacids or over-the-counter medicine. Changing your diet, losing weight, and making other lifestyle changes can also help reduce symptoms. Follow-up care is a key part of your treatment and safety. Be sure to make and go to all appointments, and call your doctor if you are having problems. It's also a good idea to know your test results and keep a list of the medicines you take. How can you care for yourself at home? · Take your medicines exactly as prescribed.  Call your doctor if you think you are having a problem with your medicine. · Your doctor may recommend over-the-counter medicine. For mild or occasional indigestion, antacids, such as Tums, Gaviscon, Mylanta, or Maalox, may help. Your doctor also may recommend over-the-counter acid reducers, such as Pepcid AC, Tagamet HB, Zantac 75, or Prilosec. Read and follow all instructions on the label. If you use these medicines often, talk with your doctor. · Change your eating habits. ¨ It's best to eat several small meals instead of two or three large meals. ¨ After you eat, wait 2 to 3 hours before you lie down. ¨ Chocolate, mint, and alcohol can make GERD worse. ¨ Spicy foods, foods that have a lot of acid (like tomatoes and oranges), and coffee can make GERD symptoms worse in some people. If your symptoms are worse after you eat a certain food, you may want to stop eating that food to see if your symptoms get better. · Do not smoke or chew tobacco. Smoking can make GERD worse. If you need help quitting, talk to your doctor about stop-smoking programs and medicines. These can increase your chances of quitting for good. · If you have GERD symptoms at night, raise the head of your bed 6 to 8 inches by putting the frame on blocks or placing a foam wedge under the head of your mattress. (Adding extra pillows does not work.)  · Do not wear tight clothing around your middle. · Lose weight if you need to. Losing just 5 to 10 pounds can help. When should you call for help? Call your doctor now or seek immediate medical care if:  ? · You have new or different belly pain. ? · Your stools are black and tarlike or have streaks of blood. ? Watch closely for changes in your health, and be sure to contact your doctor if:  ? · Your symptoms have not improved after 2 days. ? · Food seems to catch in your throat or chest.   Where can you learn more? Go to http://jadyn-dannie.info/.   Enter T450 in the search box to learn more about \"Gastroesophageal Reflux Disease (GERD): Care Instructions. \"  Current as of: May 12, 2017  Content Version: 11.4  © 3507-7163 [employer], [employer] [place]. Care instructions adapted under license by [employer] (which disclaims liability or warranty for this information). If you have questions about a medical condition or this instruction, always ask your healthcare professional. [address] any warranty or liability for your use of this information. We hope that we have addressed all of your medical concerns. The examination and treatment you received in the Emergency Department were for an emergent problem and were not intended as complete care. It is important that you follow up with your healthcare provider(s) for ongoing care. If your symptoms worsen or do not improve as expected, and you are unable to reach your usual health care provider(s), you should return to the Emergency Department. Today's healthcare is undergoing tremendous change, and patient satisfaction surveys are one of the many tools to assess the quality of medical care. You may receive a survey from the [employer] organization regarding your experience in the Emergency Department. I hope that your experience has been completely positive, particularly the medical care that I provided. As such, please participate in the survey; anything less than excellent does not meet my expectations or intentions. [facility] [place] and [facility] participate in nationally recognized quality of care measures. If your blood pressure is greater than 120/80, as reported below, we urge that you seek medical care to address the potential of high blood pressure, commonly known as hypertension. Hypertension can be hereditary or can be caused by certain medical conditions, pain, stress, or \"white coat syndrome. \"       Please make an appointment with your health care provider(s) for follow up of your Emergency Department visit. VITALS:   Patient Vitals for the past 8 hrs:   Temp Pulse Resp BP SpO2   12/18/17 2106 97.9 °F (36.6 °C) 86 16 127/79 96 %          Thank you for allowing us to provide you with medical care today. We realize that you have many choices for your emergency care needs. Please choose us in the future for any continued health care needs. Julia Johansen MD    [facility], [facility].   Office: [phone]            Recent Results (from the past 24 hour(s))   EKG, 12 LEAD, INITIAL    Collection Time: 12/18/17  9:10 PM   Result Value Ref Range    Ventricular Rate 81 BPM    Atrial Rate 81 BPM    P-R Interval 140 ms    QRS Duration 84 ms    Q-T Interval 376 ms    QTC Calculation (Bezet) 436 ms    Calculated P Axis 7 degrees    Calculated R Axis 31 degrees    Calculated T Axis 19 degrees    Diagnosis       Normal sinus rhythm  When compared with ECG of 15-JUL-2017 18:25,  No significant change was found         No results found.

## 2017-12-19 NOTE — ED PROVIDER NOTES
HPI Comments: 24 y.o. female with past medical history significant for hyperprolactinemia who presents from home with chief complaint of chest pain. Pt states that she has had on and off chest pain for the past 3 months and has been seen multiple times in ED for the complaint. Pt has previously been prescribed antacids which she has not taken. Pt is also complaining of nausea, SOB, and vomiting for the past 3 days. She has not been taking any medications for the symptoms other than ibuprofen. Pt also notes some L arm numbness that improves when she rubs her hand on her arm. Pt denies any diarrhea, medications today, fever, or leg swelling. There are no other acute medical concerns at this time. PCP: Michele Wilder MD    Note written by Reno Gonzalez, as dictated by Va Gunn MD 10:20 PM      The history is provided by the patient. No  was used. Past Medical History:   Diagnosis Date    Hyperprolactinemia ([address]     Ill-defined condition     kidney infection     Migraines     Neurological disorder     chronic migraine with aura        Past Surgical History:   Procedure Laterality Date    HX WISDOM TEETH EXTRACTION           Family History:   Problem Relation Age of Onset    Headache Mother        Social History     Social History    Marital status: SINGLE     Spouse name: N/A    Number of children: N/A    Years of education: N/A     Occupational History    Not on file. Social History Main Topics    Smoking status: Never Smoker    Smokeless tobacco: Never Used    Alcohol use Yes      Comment: Occ    Drug use: No    Sexual activity: Not on file     Other Topics Concern    Not on file     Social History Narrative    ** Merged History Encounter **              ALLERGIES: Other food and Imitrex [sumatriptan succinate]    Review of Systems   Constitutional: Negative for fever. HENT: Negative for facial swelling. Eyes: Negative for visual disturbance. Respiratory: Positive for shortness of breath. Negative for chest tightness. Cardiovascular: Positive for chest pain. Negative for leg swelling. Gastrointestinal: Positive for nausea and vomiting. Negative for abdominal pain and diarrhea. Genitourinary: Negative for dysuria. Musculoskeletal: Negative for arthralgias. Skin: Negative for rash. Neurological: Positive for numbness. Negative for dizziness. Hematological: Negative for adenopathy. Psychiatric/Behavioral: Negative for suicidal ideas. Vitals:    12/18/17 2106   BP: 127/79   Pulse: 86   Resp: 16   Temp: 97.9 °F (36.6 °C)   SpO2: 96%   Weight: 61.1 kg (134 lb 9.6 oz)   Height: 5' 3\" (1.6 m)            Physical Exam   Constitutional: She is oriented to person, place, and time. She appears well-developed and well-nourished. No distress. HENT:   Head: Normocephalic and atraumatic. Mouth/Throat: Oropharynx is clear and moist.   Eyes: Pupils are equal, round, and reactive to light. No scleral icterus. Neck: Normal range of motion. Neck supple. No thyromegaly present. Cardiovascular: Normal rate, regular rhythm, normal heart sounds and intact distal pulses. No murmur heard. Pulmonary/Chest: Effort normal and breath sounds normal. No respiratory distress. Abdominal: Soft. Bowel sounds are normal. She exhibits no distension. There is no tenderness. Musculoskeletal: Normal range of motion. She exhibits no edema. Neurological: She is alert and oriented to person, place, and time. Skin: Skin is warm and dry. No rash noted. She is not diaphoretic. Nursing note and vitals reviewed.    Note written by Reno Stewart, as dictated by Jessica Quach MD 10:22 PM      MDM  Number of Diagnoses or Management Options  Chest pain, unspecified type:   Gastroesophageal reflux disease, esophagitis presence not specified:   Raynaud's phenomenon without gangrene:   Diagnosis management comments: A:  24yo F here for CP and L arm tingling, vomiting for 2 days. This is pt's 12th ED visit in past year, several of which were for chest pain. Pt denies taking medications for these symptoms. VS are normal in ED. Exam unremarkable. CP likely GERD related. Does not meet perc criteria for d-dimer testing. Pt has not followed up with PCP despite having one and reports seeing GI doctor once but not returning. DDx:  GERD, anxiety, msk pain, malingering    P:  ecg    ED Course       Procedures    ED EKG interpretation:  Rhythm: normal sinus rhythm. Rate (approx.): 81. Axis: normal.  ST segment:  No concerning ST elevations or depressions. This EKG was interpreted by Monty Godoy MD,ED Provider. 10:52 PM  I discussed pt's multiple ED visits with her and my low concern for any dangerous etiology. She was offered PPI and carafate for symptoms but patient declined. She then began hyperventilating and had to be coached down by nursing. VS now normal again. Pt describing numbness in hands consistent with hyperventilation. Will continue to watch. Pt stable for discharge to f/u with PCP.    11:22 PM  Pt walking without difficulty and speaking on cell phone in no distress. When approached about discharged, she stated she could not feel her arms or legs and she demanded blood work. I explained that there was no indication for blood work and all of her previous blood tests were unremarkable. Pt threatened to baldev me and the hospital stating that this hospital had done her wrong many times in past but she kept coming back home that we would correct it. She also claimed to have been admitted to this hospital in acute renal failure and this was the reason we should be doing blood work now. I could find no evidence of renal disease or admission to this hospital in the medical record. Pt informed me that she would be contacting the person in charge of the ED to voice her complaints.   She walked about of the emergency without difficulty and in no apparent distress or discomfort.

## 2018-04-04 ENCOUNTER — HOSPITAL ENCOUNTER (EMERGENCY)
Age: 28
Discharge: HOME | End: 2018-04-04
Attending: EMERGENCY MEDICINE
Payer: OTHER GOVERNMENT

## 2018-04-04 VITALS
DIASTOLIC BLOOD PRESSURE: 71 MMHG | OXYGEN SATURATION: 100 % | BODY MASS INDEX: 23.35 KG/M2 | HEIGHT: 63 IN | RESPIRATION RATE: 18 BRPM | WEIGHT: 131.8 LBS | TEMPERATURE: 98.2 F | HEART RATE: 71 BPM | SYSTOLIC BLOOD PRESSURE: 106 MMHG

## 2018-04-04 DIAGNOSIS — R42 DIZZINESS: ICD-10-CM

## 2018-04-04 DIAGNOSIS — R10.84 ABDOMINAL PAIN, GENERALIZED: Primary | ICD-10-CM

## 2018-04-04 LAB
ALBUMIN SERPL-MCNC: 4.2 G/DL (ref 3.5–5)
ALBUMIN/GLOB SERPL: 1.3 {RATIO} (ref 1.1–2.2)
ALP SERPL-CCNC: 52 U/L (ref 45–117)
ALT SERPL-CCNC: 23 U/L (ref 12–78)
ANION GAP SERPL CALC-SCNC: 7 MMOL/L (ref 5–15)
APPEARANCE UR: CLEAR
AST SERPL-CCNC: 12 U/L (ref 15–37)
BACTERIA URNS QL MICRO: NEGATIVE /HPF
BASOPHILS # BLD: 0 K/UL (ref 0–0.1)
BASOPHILS NFR BLD: 1 % (ref 0–1)
BILIRUB SERPL-MCNC: 0.6 MG/DL (ref 0.2–1)
BILIRUB UR QL: NEGATIVE
BUN SERPL-MCNC: 8 MG/DL (ref 6–20)
BUN/CREAT SERPL: 12 (ref 12–20)
CALCIUM SERPL-MCNC: 9.3 MG/DL (ref 8.5–10.1)
CHLORIDE SERPL-SCNC: 106 MMOL/L (ref 97–108)
CO2 SERPL-SCNC: 27 MMOL/L (ref 21–32)
COLOR UR: ABNORMAL
CREAT SERPL-MCNC: 0.66 MG/DL (ref 0.55–1.02)
DIFFERENTIAL METHOD BLD: NORMAL
EOSINOPHIL # BLD: 0.1 K/UL (ref 0–0.4)
EOSINOPHIL NFR BLD: 2 % (ref 0–7)
EPITH CASTS URNS QL MICRO: ABNORMAL /LPF
ERYTHROCYTE [DISTWIDTH] IN BLOOD BY AUTOMATED COUNT: 12.3 % (ref 11.5–14.5)
GLOBULIN SER CALC-MCNC: 3.2 G/DL (ref 2–4)
GLUCOSE SERPL-MCNC: 90 MG/DL (ref 65–100)
GLUCOSE UR STRIP.AUTO-MCNC: NEGATIVE MG/DL
HCG UR QL: NEGATIVE
HCT VFR BLD AUTO: 39.7 % (ref 35–47)
HGB BLD-MCNC: 13.5 G/DL (ref 11.5–16)
HGB UR QL STRIP: NEGATIVE
HYALINE CASTS URNS QL MICRO: ABNORMAL /LPF (ref 0–5)
IMM GRANULOCYTES # BLD: 0 K/UL (ref 0–0.04)
IMM GRANULOCYTES NFR BLD AUTO: 0 % (ref 0–0.5)
KETONES UR QL STRIP.AUTO: NEGATIVE MG/DL
LEUKOCYTE ESTERASE UR QL STRIP.AUTO: ABNORMAL
LIPASE SERPL-CCNC: 200 U/L (ref 73–393)
LYMPHOCYTES # BLD: 2.2 K/UL (ref 0.8–3.5)
LYMPHOCYTES NFR BLD: 31 % (ref 12–49)
MCH RBC QN AUTO: 30.9 PG (ref 26–34)
MCHC RBC AUTO-ENTMCNC: 34 G/DL (ref 30–36.5)
MCV RBC AUTO: 90.8 FL (ref 80–99)
MONOCYTES # BLD: 0.4 K/UL (ref 0–1)
MONOCYTES NFR BLD: 6 % (ref 5–13)
NEUTS SEG # BLD: 4.3 K/UL (ref 1.8–8)
NEUTS SEG NFR BLD: 61 % (ref 32–75)
NITRITE UR QL STRIP.AUTO: NEGATIVE
NRBC # BLD: 0 K/UL (ref 0–0.01)
NRBC BLD-RTO: 0 PER 100 WBC
PH UR STRIP: 7 [PH] (ref 5–8)
PLATELET # BLD AUTO: 223 K/UL (ref 150–400)
PMV BLD AUTO: 10.5 FL (ref 8.9–12.9)
POTASSIUM SERPL-SCNC: 3.8 MMOL/L (ref 3.5–5.1)
PROT SERPL-MCNC: 7.4 G/DL (ref 6.4–8.2)
PROT UR STRIP-MCNC: NEGATIVE MG/DL
RBC # BLD AUTO: 4.37 M/UL (ref 3.8–5.2)
RBC #/AREA URNS HPF: ABNORMAL /HPF (ref 0–5)
SODIUM SERPL-SCNC: 140 MMOL/L (ref 136–145)
SP GR UR REFRACTOMETRY: <1.005 (ref 1–1.03)
UR CULT HOLD, URHOLD: NORMAL
UROBILINOGEN UR QL STRIP.AUTO: 0.2 EU/DL (ref 0.2–1)
WBC # BLD AUTO: 7.1 K/UL (ref 3.6–11)
WBC URNS QL MICRO: ABNORMAL /HPF (ref 0–4)

## 2018-04-04 PROCEDURE — 80053 COMPREHEN METABOLIC PANEL: CPT | Performed by: PHYSICIAN ASSISTANT

## 2018-04-04 PROCEDURE — 93005 ELECTROCARDIOGRAM TRACING: CPT

## 2018-04-04 PROCEDURE — 36415 COLL VENOUS BLD VENIPUNCTURE: CPT | Performed by: PHYSICIAN ASSISTANT

## 2018-04-04 PROCEDURE — 96361 HYDRATE IV INFUSION ADD-ON: CPT

## 2018-04-04 PROCEDURE — 99284 EMERGENCY DEPT VISIT MOD MDM: CPT

## 2018-04-04 PROCEDURE — 96375 TX/PRO/DX INJ NEW DRUG ADDON: CPT

## 2018-04-04 PROCEDURE — 96374 THER/PROPH/DIAG INJ IV PUSH: CPT

## 2018-04-04 PROCEDURE — 81025 URINE PREGNANCY TEST: CPT

## 2018-04-04 PROCEDURE — 85025 COMPLETE CBC W/AUTO DIFF WBC: CPT | Performed by: PHYSICIAN ASSISTANT

## 2018-04-04 PROCEDURE — 74011250636 HC RX REV CODE- 250/636: Performed by: NURSE PRACTITIONER

## 2018-04-04 PROCEDURE — 83690 ASSAY OF LIPASE: CPT | Performed by: PHYSICIAN ASSISTANT

## 2018-04-04 PROCEDURE — 81001 URINALYSIS AUTO W/SCOPE: CPT | Performed by: PHYSICIAN ASSISTANT

## 2018-04-04 RX ORDER — METOCLOPRAMIDE HYDROCHLORIDE 5 MG/ML
10 INJECTION INTRAMUSCULAR; INTRAVENOUS
Status: COMPLETED | OUTPATIENT
Start: 2018-04-04 | End: 2018-04-04

## 2018-04-04 RX ORDER — KETOROLAC TROMETHAMINE 30 MG/ML
30 INJECTION, SOLUTION INTRAMUSCULAR; INTRAVENOUS
Status: DISCONTINUED | OUTPATIENT
Start: 2018-04-04 | End: 2018-04-04

## 2018-04-04 RX ORDER — KETOROLAC TROMETHAMINE 30 MG/ML
30 INJECTION, SOLUTION INTRAMUSCULAR; INTRAVENOUS
Status: COMPLETED | OUTPATIENT
Start: 2018-04-04 | End: 2018-04-04

## 2018-04-04 RX ORDER — ONDANSETRON 4 MG/1
4 TABLET, ORALLY DISINTEGRATING ORAL
Qty: 10 TAB | Refills: 0 | Status: SHIPPED | OUTPATIENT
Start: 2018-04-04 | End: 2021-02-09 | Stop reason: ALTCHOICE

## 2018-04-04 RX ADMIN — KETOROLAC TROMETHAMINE 30 MG: 30 INJECTION, SOLUTION INTRAMUSCULAR; INTRAVENOUS at 18:26

## 2018-04-04 RX ADMIN — SODIUM CHLORIDE 1000 ML: 900 INJECTION, SOLUTION INTRAVENOUS at 18:26

## 2018-04-04 RX ADMIN — METOCLOPRAMIDE 10 MG: 5 INJECTION, SOLUTION INTRAMUSCULAR; INTRAVENOUS at 18:26

## 2018-04-04 NOTE — ED PROVIDER NOTES
HPI Comments: 24 y.o. female with past medical history significant for migraines, hyperprolactinemia, and kidney infection who presents ambulatory from work with chief complaint of vomiting. Patient reports 2-week history of intermittent episodes of n/v and abdominal pain. Patient reports at least one episode of vomiting per day. Patient states symptoms worsened over the last 2 days with intermittent dizziness, lightheadedness, two episodes of vomiting, subjective fevers, chills, diaphoresis, decreased appetite and lower abdominal pain \"over (her) ovaries\". Patient reports difficulty concentrating and fatigue at work secondary to symptoms. Patient states she is concerned because symptoms are c/w previous kidney infection that required treatment with IV and PO abx. Patient notes history of intermittent lower abdominal pain related to imbalance of prolactin. Patient states last bowel movement was normal yesterday. LMP normal on 3/23/18. Patient denies use of PO birth control or estrogen supplements. Patient specifically denies hematemesis, hematochezia, melena, vaginal discharge, neck pain, dyspareunia, dysuria, neck pain, chest pain, shortness of breath and syncope. There are no other acute medical concerns at this time. Social hx: never tobacco smoker; occasional EtOH use; denies illicit drug use  PCP: Gertrude Salmon MD    Note written by Reno Lowry, as dictated by Cat Champion NP 6:00 PM      The history is provided by the patient. No  was used.         Past Medical History:   Diagnosis Date    Hyperprolactinemia ([address]     Ill-defined condition     kidney infection     Migraines     Neurological disorder     chronic migraine with aura        Past Surgical History:   Procedure Laterality Date    HX WISDOM TEETH EXTRACTION           Family History:   Problem Relation Age of Onset    Headache Mother        Social History     Social History    Marital status: SINGLE Spouse name: N/A    Number of children: N/A    Years of education: N/A     Occupational History    Not on file. Social History Main Topics    Smoking status: Never Smoker    Smokeless tobacco: Never Used    Alcohol use Yes      Comment: Occ    Drug use: No    Sexual activity: Not on file     Other Topics Concern    Not on file     Social History Narrative    ** Merged History Encounter **              ALLERGIES: Other food and Imitrex [sumatriptan succinate]    Review of Systems   Constitutional: Positive for appetite change (decreased), chills and diaphoresis. HENT: Negative for facial swelling. Eyes: Negative for discharge and redness. Respiratory: Negative for shortness of breath. Cardiovascular: Negative for chest pain. Gastrointestinal: Positive for abdominal pain (intermittent), nausea and vomiting. Negative for anal bleeding, blood in stool, constipation and diarrhea. Genitourinary: Negative for dyspareunia, dysuria and vaginal discharge. Musculoskeletal: Negative for neck pain. Neurological: Positive for dizziness and light-headedness. Negative for syncope. Psychiatric/Behavioral: Negative for confusion and decreased concentration. All other systems reviewed and are negative. Vitals:    04/04/18 1638   BP: 122/85   Pulse: 71   Resp: 18   Temp: 98.2 °F (36.8 °C)   SpO2: 100%   Weight: 59.8 kg (131 lb 12.8 oz)   Height: 5' 3\" (1.6 m)            Physical Exam   Constitutional: She is oriented to person, place, and time. She appears well-developed and well-nourished. No distress. HENT:   Head: Normocephalic and atraumatic. Right Ear: External ear normal.   Left Ear: External ear normal.   Nose: Nose normal.   Eyes: Conjunctivae and EOM are normal. Pupils are equal, round, and reactive to light. Neck: Normal range of motion. Neck supple. No thyromegaly present. Cardiovascular: Normal rate, regular rhythm, normal heart sounds and intact distal pulses.     No murmur heard.  Pulmonary/Chest: Effort normal and breath sounds normal. No respiratory distress. She exhibits no tenderness. Abdominal: Soft. Bowel sounds are normal. She exhibits no distension and no mass. There is no tenderness. Musculoskeletal: Normal range of motion. She exhibits no edema or deformity. Lymphadenopathy:     She has no cervical adenopathy. Neurological: She is alert and oriented to person, place, and time. Skin: Skin is warm and dry. No rash noted. Psychiatric: She has a normal mood and affect. Her behavior is normal. Judgment and thought content normal.   Nursing note and vitals reviewed. MDM  Number of Diagnoses or Management Options  Abdominal pain, generalized:   Dizziness:   Diagnosis management comments: Physical exam largely unremarkable   Abdominal exam unrmarkable, more suspicious for an abdominal migraine. She presents with concerns over pyelonephritis  CBC, CMP, lipase, UA. IV fluids   IV toradol and reglan     Patient's symptoms improved. After re-examined patient's abdomen, she denies pain. Denies dizziness after getting IV fluids. States that she is comfortable following up with primary care provider and returning to the ER for any concerning or worsening symptoms           ED Course   Discussed patient with attending Stacy Pittman who is in agreement with plan of care and in agreement that imaging is not warranted at this time  Cristhian Art NP    ED EKG interpretation:  Rhythm: normal sinus rhythm; and regular . Rate (approx.): 71; Axis: normal; ST/T wave: normal  Cristhian Art NP    Procedures    6:00 PM  Patient declined pelvic exam at this time. No complaints of pain or nausea. Resting comfortably in stretcher. 6:15 PM  CBC, CMP unremarkable. UA demonstrates small leukocytes, but is otherwise unremarkable. Discussed available lab and imaging results with patient. She verbalizes understanding and agrees with care plan.  Patient is agreement to forgo imaging tonight in favor of outpatient f/u. Will treat with IV fluids, Reglan, Toradol at this time. 7:28 PM  Patient states that she is feeling much better and is hungry. Re-examined abdomen and no abdominal tenderness at this time. States that she is no longer dizzy or having abdominal pain. She continues to appear non-toxic and in no acute distress. Vitals normal. Instructed her to follow-up with primary care provider and return to the ER for any worsening or concerning symptoms. Instructed to return should her abdominal pain return and became localized to one area. She verbalized understanding. All questions answered. Tommie Severance, NP          I was personally available for consultation in the emergency department. I have reviewed the chart and agree with the documentation recorded by the [facility], including the assessment, treatment plan, and disposition.   Eduardo Hernandez MD

## 2018-04-04 NOTE — ED NOTES

## 2018-04-05 LAB
ATRIAL RATE: 71 BPM
CALCULATED P AXIS, ECG09: 17 DEGREES
CALCULATED R AXIS, ECG10: 34 DEGREES
CALCULATED T AXIS, ECG11: 13 DEGREES
DIAGNOSIS, 93000: NORMAL
P-R INTERVAL, ECG05: 140 MS
Q-T INTERVAL, ECG07: 410 MS
QRS DURATION, ECG06: 82 MS
QTC CALCULATION (BEZET), ECG08: 445 MS
VENTRICULAR RATE, ECG03: 71 BPM

## 2021-01-13 ENCOUNTER — HOSPITAL ENCOUNTER (EMERGENCY)
Age: 31
Discharge: HOME | End: 2021-01-13
Attending: EMERGENCY MEDICINE
Payer: COMMERCIAL

## 2021-01-13 ENCOUNTER — APPOINTMENT (OUTPATIENT)
Dept: GENERAL RADIOLOGY | Age: 31
End: 2021-01-13
Attending: EMERGENCY MEDICINE
Payer: COMMERCIAL

## 2021-01-13 VITALS
OXYGEN SATURATION: 98 % | TEMPERATURE: 98 F | RESPIRATION RATE: 16 BRPM | DIASTOLIC BLOOD PRESSURE: 89 MMHG | SYSTOLIC BLOOD PRESSURE: 144 MMHG | HEART RATE: 81 BPM

## 2021-01-13 DIAGNOSIS — R07.9 CHEST PAIN, UNSPECIFIED TYPE: Primary | ICD-10-CM

## 2021-01-13 DIAGNOSIS — R94.31 ABNORMAL EKG: ICD-10-CM

## 2021-01-13 DIAGNOSIS — Z20.822 LAB TEST NEGATIVE FOR COVID-19 VIRUS: ICD-10-CM

## 2021-01-13 LAB
ALBUMIN SERPL-MCNC: 4.5 G/DL (ref 3.5–5)
ALBUMIN/GLOB SERPL: 1.4 {RATIO} (ref 1.1–2.2)
ALP SERPL-CCNC: 55 U/L (ref 45–117)
ALT SERPL-CCNC: 23 U/L (ref 12–78)
ANION GAP SERPL CALC-SCNC: 6 MMOL/L (ref 5–15)
AST SERPL-CCNC: 9 U/L (ref 15–37)
BASOPHILS # BLD: 0.1 K/UL (ref 0–0.1)
BASOPHILS NFR BLD: 1 % (ref 0–1)
BILIRUB SERPL-MCNC: 0.4 MG/DL (ref 0.2–1)
BUN SERPL-MCNC: 13 MG/DL (ref 6–20)
BUN/CREAT SERPL: 15 (ref 12–20)
CALCIUM SERPL-MCNC: 9.4 MG/DL (ref 8.5–10.1)
CHLORIDE SERPL-SCNC: 105 MMOL/L (ref 97–108)
CO2 SERPL-SCNC: 28 MMOL/L (ref 21–32)
COMMENT, HOLDF: NORMAL
CREAT SERPL-MCNC: 0.88 MG/DL (ref 0.55–1.02)
D DIMER PPP FEU-MCNC: <0.19 MG/L FEU (ref 0–0.65)
DIFFERENTIAL METHOD BLD: NORMAL
EOSINOPHIL # BLD: 0.2 K/UL (ref 0–0.4)
EOSINOPHIL NFR BLD: 3 % (ref 0–7)
ERYTHROCYTE [DISTWIDTH] IN BLOOD BY AUTOMATED COUNT: 11.9 % (ref 11.5–14.5)
GLOBULIN SER CALC-MCNC: 3.3 G/DL (ref 2–4)
GLUCOSE SERPL-MCNC: 105 MG/DL (ref 65–100)
HCT VFR BLD AUTO: 39.3 % (ref 35–47)
HGB BLD-MCNC: 13.6 G/DL (ref 11.5–16)
IMM GRANULOCYTES # BLD AUTO: 0 K/UL (ref 0–0.04)
IMM GRANULOCYTES NFR BLD AUTO: 0 % (ref 0–0.5)
LYMPHOCYTES # BLD: 2.3 K/UL (ref 0.8–3.5)
LYMPHOCYTES NFR BLD: 32 % (ref 12–49)
MCH RBC QN AUTO: 30.4 PG (ref 26–34)
MCHC RBC AUTO-ENTMCNC: 34.6 G/DL (ref 30–36.5)
MCV RBC AUTO: 87.7 FL (ref 80–99)
MONOCYTES # BLD: 0.4 K/UL (ref 0–1)
MONOCYTES NFR BLD: 5 % (ref 5–13)
NEUTS SEG # BLD: 4.2 K/UL (ref 1.8–8)
NEUTS SEG NFR BLD: 59 % (ref 32–75)
NRBC # BLD: 0 K/UL (ref 0–0.01)
NRBC BLD-RTO: 0 PER 100 WBC
PLATELET # BLD AUTO: 260 K/UL (ref 150–400)
PMV BLD AUTO: 10.6 FL (ref 8.9–12.9)
POTASSIUM SERPL-SCNC: 3.5 MMOL/L (ref 3.5–5.1)
PROT SERPL-MCNC: 7.8 G/DL (ref 6.4–8.2)
RBC # BLD AUTO: 4.48 M/UL (ref 3.8–5.2)
SAMPLES BEING HELD,HOLD: NORMAL
SODIUM SERPL-SCNC: 139 MMOL/L (ref 136–145)
TROPONIN I SERPL-MCNC: <0.05 NG/ML
WBC # BLD AUTO: 7.2 K/UL (ref 3.6–11)

## 2021-01-13 PROCEDURE — 87635 SARS-COV-2 COVID-19 AMP PRB: CPT

## 2021-01-13 PROCEDURE — 80053 COMPREHEN METABOLIC PANEL: CPT

## 2021-01-13 PROCEDURE — 99283 EMERGENCY DEPT VISIT LOW MDM: CPT

## 2021-01-13 PROCEDURE — 93005 ELECTROCARDIOGRAM TRACING: CPT

## 2021-01-13 PROCEDURE — 36415 COLL VENOUS BLD VENIPUNCTURE: CPT

## 2021-01-13 PROCEDURE — 71045 X-RAY EXAM CHEST 1 VIEW: CPT

## 2021-01-13 PROCEDURE — 85379 FIBRIN DEGRADATION QUANT: CPT

## 2021-01-13 PROCEDURE — 85025 COMPLETE CBC W/AUTO DIFF WBC: CPT

## 2021-01-13 PROCEDURE — 84484 ASSAY OF TROPONIN QUANT: CPT

## 2021-01-13 NOTE — DISCHARGE INSTRUCTIONS
Please treat discomfort with tylenol or motrin as needed for pain. Please socially isolate until you receive the results of your COVID test today and they show that they are negative. If the results are positive, you must quarantine for 10 days until you have no more symptoms and you have been 3 days without a fever and without use of antipyretics such as tylenol or motrin. Please wash your hands frequently as well as communal surfaces. Please do not go to work until the results of your COVID test have been obtained. Please return for worsening symptoms. Please take Vitamin C or Zinc at home.

## 2021-01-13 NOTE — ED TRIAGE NOTES
Pt c/o constat mid chest pain x 48 hours, low grade temp, +sob, denies cough , worse with movement , +n/v

## 2021-01-13 NOTE — ED PROVIDER NOTES
Cici Llanes is a 31 y.o. female who presents with mid-sternal 4/10 chest pain x 2 days. Reports she feels like her lungs are \"on fire\" noting SOB. Pt states she is suspected to have asthma and has inhaler at home. Has not helped feelings of SOB. Endorses some congestion, some rhinorrhea, subjective fever (highest 99.8), nausea, vomiting (6-7 times, none since yesterday), notes some intermittent mild abdominal pain, intermittent decreased appetite, some headaches, some slight blurred vision (reports she does have glasses). States she has been using Flonase. Has been able to tolerate PO intake today. Denies dysuria, urinary frequency. 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 Denies leg swelling, erythema, denies hx DVT/PE, denies recent travel, surgeries or immobilization. Denies cough, diarrhea, constipation, light, dizzy, visual changes, neck pain/stiffness, rash, dysuria, urinary frequency. Social hx: - smoke, + EtOH (occ), - drugs    The history is provided by the patient. Chest Pain (Angina)   Associated symptoms include abdominal pain, a fever, headaches, nausea, shortness of breath and vomiting. Pertinent negatives include no cough and no dizziness.         Past Medical History:   Diagnosis Date    Hyperprolactinemia ([address]     Ill-defined condition     kidney infection     Migraines     Neurological disorder     chronic migraine with aura        Past Surgical History:   Procedure Laterality Date    HX WISDOM TEETH EXTRACTION           Family History:   Problem Relation Age of Onset    Headache Mother        Social History     Socioeconomic History    Marital status: SINGLE     Spouse name: Not on file    Number of children: Not on file    Years of education: Not on file    Highest education level: Not on file   Occupational History    Not on file   Social Needs    Financial resource strain: Not on file    Food insecurity     Worry: Not on file     Inability: Not on file    Transportation needs     Medical: Not on file     Non-medical: Not on file   Tobacco Use    Smoking status: Never Smoker    Smokeless tobacco: Never Used   Substance and Sexual Activity    Alcohol use: Yes     Comment: Occ    Drug use: No    Sexual activity: Not on file   Lifestyle    Physical activity     Days per week: Not on file     Minutes per session: Not on file    Stress: Not on file   Relationships    Social connections     Talks on phone: Not on file     Gets together: Not on file     Attends [employer] service: Not on file     Active member of club or organization: Not on file     Attends meetings of clubs or organizations: Not on file     Relationship status: Not on file    Intimate partner violence     Fear of current or ex partner: Not on file     Emotionally abused: Not on file     Physically abused: Not on file     Forced sexual activity: Not on file   Other Topics Concern    Not on file   Social History Narrative    ** Merged History Encounter **              ALLERGIES: Other food and Imitrex [sumatriptan succinate]    Review of Systems   Constitutional: Positive for appetite change and fever. HENT: Positive for congestion and rhinorrhea. Eyes: Positive for visual disturbance. Respiratory: Positive for shortness of breath. Negative for cough. Cardiovascular: Positive for chest pain. Gastrointestinal: Positive for abdominal pain, nausea and vomiting. Negative for constipation and diarrhea. Genitourinary: Negative for dysuria and frequency. Musculoskeletal: Negative for neck pain and neck stiffness. Skin: Negative for rash. Neurological: Positive for headaches. Negative for dizziness and light-headedness. All other systems reviewed and are negative. Vitals:    01/13/21 1520   BP: (!) 144/89   Pulse: 81   Resp: 16   Temp: 98 °F (36.7 °C)   SpO2: 98%            Physical Exam  Vitals signs and nursing note reviewed. Constitutional:       General: She is not in acute distress. Appearance: Normal appearance. She is not ill-appearing, toxic-appearing or diaphoretic. HENT:      Head: Atraumatic. Mouth/Throat:      Mouth: Mucous membranes are moist.   Eyes:      Conjunctiva/sclera: Conjunctivae normal.   Neck:      Musculoskeletal: Neck supple. Cardiovascular:      Rate and Rhythm: Normal rate and regular rhythm. Heart sounds: Normal heart sounds. No murmur. Pulmonary:      Effort: Pulmonary effort is normal. No respiratory distress. Breath sounds: Normal breath sounds. No stridor. No wheezing, rhonchi or rales. Abdominal:      General: Bowel sounds are normal. There is no distension. Palpations: Abdomen is soft. Tenderness: There is no abdominal tenderness. There is no guarding or rebound. Musculoskeletal:         General: No swelling or deformity. Skin:     General: Skin is warm and dry. Neurological:      Mental Status: She is alert and oriented to person, place, and time. Mental status is at baseline. MDM  Number of Diagnoses or Management Options     Amount and/or Complexity of Data Reviewed  Clinical lab tests: ordered and reviewed  Tests in the radiology section of CPT®: ordered and reviewed  Tests in the medicine section of CPT®: ordered and reviewed  Discuss the patient with other providers: yes (Dr Donald Blancas, ED atteding)           Procedures      EKG obtained at 15:26  My interpretation  Rate: 72 bpm, regular rhythm. Normal axis. No STEMI.  RBBB        VITAL SIGNS:  Vitals:    01/13/21 1520   BP: (!) 144/89   Pulse: 81   Resp: 16   Temp: 98 °F (36.7 °C)   SpO2: 98%          LABS:  Recent Results (from the past 24 hour(s))   EKG, 12 LEAD, INITIAL    Collection Time: 01/13/21  3:26 PM   Result Value Ref Range    Ventricular Rate 72 BPM    Atrial Rate 72 BPM    P-R Interval 136 ms    QRS Duration 122 ms    Q-T Interval 392 ms    QTC Calculation (Bezet) 429 ms    Calculated P Axis 43 degrees    Calculated R Axis 75 degrees    Calculated T Axis 55 degrees    Diagnosis       Normal sinus rhythm  Right bundle branch block  When compared with ECG of 04-APR-2018 16:45,  Right bundle branch block is now present  Confirmed by Florentin Sharma MD ([id number]) on 1/14/2021 9:52:32 AM     CBC WITH AUTOMATED DIFF    Collection Time: 01/13/21  4:29 PM   Result Value Ref Range    WBC 7.2 3.6 - 11.0 K/uL    RBC 4.48 3.80 - 5.20 M/uL    HGB 13.6 11.5 - 16.0 g/dL    HCT 39.3 35.0 - 47.0 %    MCV 87.7 80.0 - 99.0 FL    MCH 30.4 26.0 - 34.0 PG    MCHC 34.6 30.0 - 36.5 g/dL    RDW 11.9 11.5 - 14.5 %    PLATELET 034 032 - 940 K/uL    MPV 10.6 8.9 - 12.9 FL    NRBC 0.0 0  WBC    ABSOLUTE NRBC 0.00 0.00 - 0.01 K/uL    NEUTROPHILS 59 32 - 75 %    LYMPHOCYTES 32 12 - 49 %    MONOCYTES 5 5 - 13 %    EOSINOPHILS 3 0 - 7 %    BASOPHILS 1 0 - 1 %    IMMATURE GRANULOCYTES 0 0.0 - 0.5 %    ABS. NEUTROPHILS 4.2 1.8 - 8.0 K/UL    ABS. LYMPHOCYTES 2.3 0.8 - 3.5 K/UL    ABS. MONOCYTES 0.4 0.0 - 1.0 K/UL    ABS. EOSINOPHILS 0.2 0.0 - 0.4 K/UL    ABS. BASOPHILS 0.1 0.0 - 0.1 K/UL    ABS. IMM. GRANS. 0.0 0.00 - 0.04 K/UL    DF AUTOMATED     METABOLIC PANEL, COMPREHENSIVE    Collection Time: 01/13/21  4:29 PM   Result Value Ref Range    Sodium 139 136 - 145 mmol/L    Potassium 3.5 3.5 - 5.1 mmol/L    Chloride 105 97 - 108 mmol/L    CO2 28 21 - 32 mmol/L    Anion gap 6 5 - 15 mmol/L    Glucose 105 (H) 65 - 100 mg/dL    BUN 13 6 - 20 MG/DL    Creatinine 0.88 0.55 - 1.02 MG/DL    BUN/Creatinine ratio 15 12 - 20      GFR est AA >60 >60 ml/min/1.73m2    GFR est non-AA >60 >60 ml/min/1.73m2    Calcium 9.4 8.5 - 10.1 MG/DL    Bilirubin, total 0.4 0.2 - 1.0 MG/DL    ALT (SGPT) 23 12 - 78 U/L    AST (SGOT) 9 (L) 15 - 37 U/L    Alk.  phosphatase 55 45 - 117 U/L    Protein, total 7.8 6.4 - 8.2 g/dL    Albumin 4.5 3.5 - 5.0 g/dL    Globulin 3.3 2.0 - 4.0 g/dL    A-G Ratio 1.4 1.1 - 2.2     TROPONIN I    Collection Time: 01/13/21  4:29 PM Result Value Ref Range    Troponin-I, Qt. <0.05 <0.05 ng/mL   D DIMER    Collection Time: 01/13/21  4:29 PM   Result Value Ref Range    D-dimer <0.19 0.00 - 0.65 mg/L FEU   SAMPLES BEING HELD    Collection Time: 01/13/21  4:29 PM   Result Value Ref Range    SAMPLES BEING HELD 1 RED     COMMENT        Add-on orders for these samples will be processed based on acceptable specimen integrity and analyte stability, which may vary by analyte. SARS-COV-2    Collection Time: 01/13/21  5:23 PM   Result Value Ref Range    Specimen source Nasopharyngeal      Specimen source Nasopharyngeal      Specimen type NP Swab      Health status Symptomatic Testing      COVID-19 PENDING          IMAGING:  XR CHEST PORT   Final Result   IMPRESSION:   No acute process identified. .                   Medications During Visit:  Medications - No data to display      DECISION MAKING:  DDx: ACS, STEMI, NSTEMI, PE, Ptx, pneumonia, COVID, viral URI, asthma, congenital heart disease, gastritis, dehydration, electrolyte abnormality    Yoselin Londono is a 31 y.o. female who comes in as above. She is afebrile at current presentation. She notes possible hx asthma, tried inhaler at home without improvement. Lungs without wheezing, 98% on room air without increased work of breathing. CXR demonstrating no evidence of pneumonia or Ptx. Possible viral URI vs COVID infection. COVID swab obtained today and I have discussed with pt precautions and recommendations for isolation and distancing and return precautions. I have also recommended treatment with Vitamin C and Zinc, there is no evidence of pneumonia on CXR. Pt noted some nausea and vomiting yesterday, states GI symptoms have improved today and she is tolerating PO intake of both food and fluids at home. Blood work not indicative of major electrolyte dysfunction or dehydration. EKG demonstrating RBBB in an otherwise overall healthy female. Troponin and D-dimer both without major elevation.  Pt remains hemodynamically stable during ED stay. I have discussed with her results as obtained today and MDM as above. We have discussed close return precautions. I have recommended FU with cardiology for further evaluation into abnormal EKG as obtained today. She verbalizes understanding of the above and agreement with care plan. IMPRESSION:  1. Chest pain, unspecified type    2. Abnormal EKG    3. Lab test negative for COVID-19 virus        DISPOSITION:  Discharged      Discharge Medication List as of 1/13/2021  6:08 PM           Follow-up Information     Follow up With Specialties Details Why Contact Info    Sawyer Lopez, DO Cardiology Schedule an appointment as soon as possible for a visit  Please schedule an appointment with cardiologist for further evaluation of your EKG abnormality [address]  [address] [facility] [address]      [address] [facility] Emergency Medicine  As needed, If symptoms worsen, if worsening shortness of breath, chest pain, or onset of dizziness, loss of consciousness [facility]  [phone]            The patient is asked to follow-up with their primary care provider and any other physicians as above in the next several days. They are to call tomorrow for an appointment. The patient is asked to return promptly for any increased concerns or worsening of symptoms and for return precautions regarding their symptoms today. They can return to this emergency department or any other emergency department. I have discussed with them results as above and presented opportunity for questions. They verbalize their understanding and agreement with care plan.

## 2021-01-13 NOTE — LETTER
[address] 
[address] [facility] [address] [id number] [phone] Work/School Note Date: 1/13/2021 To Whom It May concern: 
 
Payton Hughes was seen and treated today in the emergency room by the following provider(s): 
Attending Provider: Robyn Paul MD 
Physician Assistant: Jessa Ross. Payton Hughes has obtained a COVID test today. Please excuse her from work until the results of the COVID test have returned and are negative.  
 
Sincerely, 
 
 
 
 
 
 
Angel Cheung PA-C

## 2021-01-14 ENCOUNTER — PATIENT OUTREACH (OUTPATIENT)
Dept: CASE MANAGEMENT | Age: 31
End: 2021-01-14

## 2021-01-14 LAB
ATRIAL RATE: 72 BPM
CALCULATED P AXIS, ECG09: 43 DEGREES
CALCULATED R AXIS, ECG10: 75 DEGREES
CALCULATED T AXIS, ECG11: 55 DEGREES
COVID-19, XGCOVT: NOT DETECTED
DIAGNOSIS, 93000: NORMAL
HEALTH STATUS, XMCV2T: NORMAL
P-R INTERVAL, ECG05: 136 MS
Q-T INTERVAL, ECG07: 392 MS
QRS DURATION, ECG06: 122 MS
QTC CALCULATION (BEZET), ECG08: 429 MS
SOURCE, COVRS: NORMAL
SPECIMEN SOURCE, FCOV2M: NORMAL
SPECIMEN TYPE, XMCV1T: NORMAL
VENTRICULAR RATE, ECG03: 72 BPM

## 2021-01-14 NOTE — ACP (ADVANCE CARE PLANNING)
Advance Care Planning:   Does patient have an Advance Directive:  not on file; education provided. Patient is not interested in completing an Advance Medical Directive at this time. Floyd Gage

## 2021-01-14 NOTE — PROGRESS NOTES
Patient contacted regarding Natalia Bello. Discussed COVID-19 related testing which was pending at this time. Test results were pending. Patient informed of results, if available? Result Pending. Care Transition Nurse/ Ambulatory Care Manager contacted the patient by telephone to perform post discharge assessment. Call within 2 business days of discharge: Yes Verified name and  with patient as identifiers. Provided introduction to self, and explanation of the CTN/ACM role, and reason for call due to risk factors for infection and/or exposure to COVID-19. Symptoms reviewed with patient who verbalized the following symptoms: chest pain, fatigue, pain or aching joints, shortness of breath, fast heart rate, no new symptoms and no worsening symptoms     Patient reports \"pressure\" in her chest that is exacerbated by taking a deep breath; pt reports that this was present yesterday during ED visit and that she reported it to ED staff. Pt reports that chest pressure has not changed in location, character or intensity post-discharge. Due to no new or worsening symptoms encounter was not routed to provider for escalation. Discussed follow-up appointments. If no appointment was previously scheduled, appointment scheduling offered:  Yes; patient does not currently have a PCP due to recent insurance change, however she is interested in establishing care with a PCP. ACM provided patient with [facility] as a resource; ACM offered to assist patient with appointment scheduling once she chosen a provider/practice. Pt was advised to f/u with  (Cardiology) post-discharge. [facility] follow up appointment(s):   Future Appointments   Date Time Provider Suman Brown   2021  3:40 PM Aly Gaytan DO [facility]     Non-[facility] follow up appointment(s): N/A     Advance Care Planning:   Does patient have an Advance Directive:  not on file; education provided.  Patient is not interested in completing an Advance Medical Directive at this time. .     Patient has following risk factors of: no known risk factors. CTN/ACM reviewed discharge instructions, medical action plan and red flags such as increased shortness of breath, increasing fever and signs of decompensation with patient who verbalized understanding. Discussed exposure protocols and quarantine with CDC Guidelines What to do if you are sick with coronavirus disease 2019.  Patient was given an opportunity for questions and concerns. The patient agrees to contact the [facility] exposure line [phone], [facility]  ([phone] and PCP office for questions related to their healthcare. CTN/ACM provided contact information for future needs. Reviewed and educated patient on any new and changed medications related to discharge diagnosis; Rx - none. Patient/family/caregiver given information for [employer] and agrees to enroll yes  Patient's preferred e-mail: [email or web address]   Patient's preferred phone number: [phone]  Based on Loop alert triggers, patient will be contacted by nurse care manager for worsening symptoms. Pt will be further monitored by COVID Loop Team based on severity of symptoms and risk factors.

## 2021-01-15 ENCOUNTER — PATIENT OUTREACH (OUTPATIENT)
Dept: CASE MANAGEMENT | Age: 31
End: 2021-01-15

## 2021-01-15 NOTE — PROGRESS NOTES
ACM outreach attempt to patient today in order to perform follow-up and notify patient of COVID-19 test results; lvm requesting a return phone call to this ACM.     Patient will be further monitored by COVID Loop Team.

## 2021-02-09 ENCOUNTER — OFFICE VISIT (OUTPATIENT)
Dept: CARDIOLOGY CLINIC | Age: 31
End: 2021-02-09
Payer: COMMERCIAL

## 2021-02-09 VITALS
HEIGHT: 63 IN | DIASTOLIC BLOOD PRESSURE: 80 MMHG | BODY MASS INDEX: 24.7 KG/M2 | OXYGEN SATURATION: 99 % | HEART RATE: 64 BPM | WEIGHT: 139.4 LBS | SYSTOLIC BLOOD PRESSURE: 122 MMHG

## 2021-02-09 DIAGNOSIS — I45.9 INTRA-VENTRICULAR CONDUCTION DELAY: ICD-10-CM

## 2021-02-09 DIAGNOSIS — R06.09 DYSPNEA ON EXERTION: ICD-10-CM

## 2021-02-09 DIAGNOSIS — R07.9 CHEST PAIN, UNSPECIFIED TYPE: Primary | ICD-10-CM

## 2021-02-09 PROCEDURE — 99213 OFFICE O/P EST LOW 20 MIN: CPT | Performed by: STUDENT IN AN ORGANIZED HEALTH CARE EDUCATION/TRAINING PROGRAM

## 2021-02-09 NOTE — PROGRESS NOTES
Valerie Flores is a 31 y.o. female    Chief Complaint   Patient presents with   [facility] Follow Up    New Patient    Chest Pain       Chest pain patient states CP about 3 years ago     SOB no    Dizziness patient states dizziness at times     Swelling no    Refills no    Visit Vitals  /80 (BP 1 Location: Left upper arm, BP Patient Position: Sitting)   Pulse 64   Ht 5' 3\" (1.6 m)   Wt 139 lb 6.4 oz (63.2 kg)   SpO2 99%   BMI 24.69 kg/m²       1. Have you been to the ER, urgent care clinic since your last visit? Hospital[facility] since your last visit[facility]    2. Have you seen or consulted any other health care providers outside of the [address] [place] [address] since your last visit? Include any pap smears or colon screening.  no

## 2021-02-09 NOTE — PROGRESS NOTES
[facility] [address], [address] [facility], [address] [place] [address]    Office [phone],[facility] [phone]           Valerie Flores is a 31 y.o. female presents to the office for evaluation of chest pain      Assessment/Recommendations:    ICD-10-CM ICD-9-CM    1. Chest pain, unspecified type  R07.9 786.50    2. Dyspnea on exertion  R06.00 786.09    3. Intra-ventricular conduction delay  I45.9 426.50      Recommend to proceed with echocardiogram for further evaluation. Primary Care Physician- None    Follow-up 2 to 3 weeks        Subjective:  31 y.o. presents to the office for evaluation of chest pain. She was recently evaluated at [facility]. She reports having chest pain symptoms since 2016. Her chest pain symptoms have increased over the last several months. She reports a sharp stabbing pain along with a squeezing discomfort. Symptoms are random in nature. They have not progressed over the last several months. 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 The symptoms last several minutes before resolution. Does not appear to have exertional chest pain symptoms. Recent EKG showed normal sinus rhythm with interventricular conduction delay. CT scan of the chest performed at [facility] did not show evidence of pulmonary emboli or other abnormality.     She is not aware of her family history, she is adopted from [facility]  No history of smoking, illicit drug use, alcohol use    Past Medical History:   Diagnosis Date    Hyperprolactinemia ([address]     Ill-defined condition     kidney infection     Migraines     Neurological disorder     chronic migraine with aura         Past Surgical History:   Procedure Laterality Date    HX WISDOM TEETH EXTRACTION           Current Outpatient Medications:     POTASSIUM-99 PO, Take  by mouth., Disp: , Rfl:     ferrous sulfate (IRON PO), Take  by mouth., Disp: , Rfl:     vit B complex no.12/niacin,B3, (VITAMIN B COMPLEX NO.12-NIACIN PO), Take  by mouth., Disp: , Rfl:     ondansetron (ZOFRAN ODT) 4 mg disintegrating tablet, Take 1 Tab by mouth every eight (8) hours as needed for Nausea., Disp: 10 Tab, Rfl: 0    Allergies   Allergen Reactions    Other Food Rash     CHERRY - rash, Blue Agave- anaphylaxis, cinnamon - rash    Imitrex [Sumatriptan Succinate] Other (comments)     confusion        Family History   Problem Relation Age of Onset    Headache Mother        Social History     Tobacco Use    Smoking status: Never Smoker    Smokeless tobacco: Never Used   Substance Use Topics    Alcohol use: Yes     Comment: Occ    Drug use: No     Unknown family history, adopted from [facility]      Review of Symptoms:  Pertinent Positive: Chest pain, exertional dyspnea, nausea  Pertinent Negative: No orthopnea, PND, palpitations  All Other systems reviewed and are negative for a Comprehensive ROS (10+)    Physical Exam    Blood pressure 122/80, pulse 64, height 5' 3\" (1.6 m), weight 139 lb 6.4 oz (63.2 kg), SpO2 99 %. Constitutional:  well-developed and well-nourished. No distress. HENT: Normocephalic. Eyes: No scleral icterus. Neck:  Neck supple. No JVD present. Pulmonary/Chest: Effort normal and breath sounds normal. No respiratory distress, wheezes or rales. Cardiovascular: Normal rate, regular rhythm, S1 S2 . Exam reveals no gallop and no friction rub. No murmur heard. No edema. Extremities:  Normal muscle tone  Abdominal:   No abnormal distension. Neurological:  Moving all extremities, cranial nerves appear grossly intact. Skin: Skin is not cold. Not diaphoretic. No erythema. Psychiatric:  Grossly normal mood and affect. Intact insight.     Objective Data:    ECG: personally reviewed and  interpreted  1/14/2021-sinus rhythm, nonspecific intraventricular conduction delay                Jennifer Bentley, DO          ATTENTION:   This medical record was transcribed using an electronic medical records/speech recognition system. Although proofread, it may and can contain electronic, spelling and other errors. Corrections may be executed at a later time. Please feel free to contact us for any clarifications as needed.

## 2021-02-24 ENCOUNTER — ANCILLARY PROCEDURE (OUTPATIENT)
Dept: CARDIOLOGY CLINIC | Age: 31
End: 2021-02-24
Payer: COMMERCIAL

## 2021-02-24 VITALS
BODY MASS INDEX: 24.63 KG/M2 | HEIGHT: 63 IN | SYSTOLIC BLOOD PRESSURE: 120 MMHG | DIASTOLIC BLOOD PRESSURE: 80 MMHG | WEIGHT: 139 LBS

## 2021-02-24 DIAGNOSIS — R06.09 DOE (DYSPNEA ON EXERTION): ICD-10-CM

## 2021-02-24 DIAGNOSIS — R07.9 CHEST PAIN, UNSPECIFIED TYPE: ICD-10-CM

## 2021-02-24 LAB
ECHO AO ROOT DIAM: 3.02 CM
ECHO AV AREA PEAK VELOCITY: 2.41 CM2
ECHO AV AREA VTI: 2.29 CM2
ECHO AV AREA/BSA PEAK VELOCITY: 1.5 CM2/M2
ECHO AV AREA/BSA VTI: 1.4 CM2/M2
ECHO AV MEAN GRADIENT: 3.02 MMHG
ECHO AV PEAK GRADIENT: 5.87 MMHG
ECHO AV PEAK VELOCITY: 121.1 CM/S
ECHO AV VTI: 25.43 CM
ECHO EST RA PRESSURE: 3 MMHG
ECHO LA AREA 4C: 13.96 CM2
ECHO LA MAJOR AXIS: 3.1 CM
ECHO LA MINOR AXIS: 1.87 CM
ECHO LA VOL 2C: 45.17 ML (ref 22–52)
ECHO LA VOL 4C: 32.19 ML (ref 22–52)
ECHO LA VOL BP: 42.21 ML (ref 22–52)
ECHO LA VOL/BSA BIPLANE: 25.48 ML/M2 (ref 16–28)
ECHO LA VOLUME INDEX A2C: 27.26 ML/M2 (ref 16–28)
ECHO LA VOLUME INDEX A4C: 19.43 ML/M2 (ref 16–28)
ECHO LV E' LATERAL VELOCITY: 16.73 CM/S
ECHO LV E' SEPTAL VELOCITY: 10.57 CM/S
ECHO LV EDV A2C: 73.49 ML
ECHO LV EDV A4C: 106.42 ML
ECHO LV EDV BP: 89 ML (ref 56–104)
ECHO LV EDV INDEX A4C: 64.2 ML/M2
ECHO LV EDV INDEX BP: 53.7 ML/M2
ECHO LV EDV NDEX A2C: 44.4 ML/M2
ECHO LV EJECTION FRACTION A2C: 61 PERCENT
ECHO LV EJECTION FRACTION A4C: 61 PERCENT
ECHO LV EJECTION FRACTION BIPLANE: 60 PERCENT (ref 55–100)
ECHO LV ESV A2C: 28.72 ML
ECHO LV ESV A4C: 41.65 ML
ECHO LV ESV BP: 35.62 ML (ref 19–49)
ECHO LV ESV INDEX A2C: 17.3 ML/M2
ECHO LV ESV INDEX A4C: 25.1 ML/M2
ECHO LV ESV INDEX BP: 21.5 ML/M2
ECHO LV INTERNAL DIMENSION DIASTOLIC: 4.5 CM (ref 3.9–5.3)
ECHO LV INTERNAL DIMENSION SYSTOLIC: 3.13 CM
ECHO LV IVSD: 0.72 CM (ref 0.6–0.9)
ECHO LV MASS 2D: 93.1 G (ref 67–162)
ECHO LV MASS INDEX 2D: 56.2 G/M2 (ref 43–95)
ECHO LV POSTERIOR WALL DIASTOLIC: 0.65 CM (ref 0.6–0.9)
ECHO LVOT DIAM: 2.03 CM
ECHO LVOT PEAK GRADIENT: 3.27 MMHG
ECHO LVOT PEAK VELOCITY: 90.35 CM/S
ECHO LVOT SV: 58.2 ML
ECHO LVOT VTI: 18.03 CM
ECHO MV A VELOCITY: 49.05 CM/S
ECHO MV AREA PHT: 4.23 CM2
ECHO MV E DECELERATION TIME (DT): 179.47 MS
ECHO MV E VELOCITY: 84.64 CM/S
ECHO MV E/A RATIO: 1.73
ECHO MV E/E' LATERAL: 5.06
ECHO MV E/E' RATIO (AVERAGED): 6.53
ECHO MV E/E' SEPTAL: 8.01
ECHO MV PRESSURE HALF TIME (PHT): 52.05 MS
ECHO RA AREA 4C: 13.2 CM2
ECHO RIGHT VENTRICULAR SYSTOLIC PRESSURE (RVSP): 15.11 MMHG
ECHO RV INTERNAL DIMENSION: 3.44 CM
ECHO RV TAPSE: 2.13 CM (ref 1.5–2)
ECHO TV REGURGITANT MAX VELOCITY: 173.98 CM/S
ECHO TV REGURGITANT PEAK GRADIENT: 12.11 MMHG
LA VOL DISK BP: 38.42 ML (ref 22–52)

## 2021-02-24 PROCEDURE — 93306 TTE W/DOPPLER COMPLETE: CPT | Performed by: STUDENT IN AN ORGANIZED HEALTH CARE EDUCATION/TRAINING PROGRAM

## 2021-03-02 ENCOUNTER — OFFICE VISIT (OUTPATIENT)
Dept: CARDIOLOGY CLINIC | Age: 31
End: 2021-03-02
Payer: COMMERCIAL

## 2021-03-02 VITALS
HEART RATE: 72 BPM | HEIGHT: 63 IN | SYSTOLIC BLOOD PRESSURE: 118 MMHG | DIASTOLIC BLOOD PRESSURE: 82 MMHG | BODY MASS INDEX: 24.95 KG/M2 | WEIGHT: 140.8 LBS | OXYGEN SATURATION: 96 %

## 2021-03-02 DIAGNOSIS — R07.9 CHEST PAIN, UNSPECIFIED TYPE: Primary | ICD-10-CM

## 2021-03-02 DIAGNOSIS — R06.09 DOE (DYSPNEA ON EXERTION): ICD-10-CM

## 2021-03-02 PROCEDURE — 99213 OFFICE O/P EST LOW 20 MIN: CPT | Performed by: STUDENT IN AN ORGANIZED HEALTH CARE EDUCATION/TRAINING PROGRAM

## 2021-03-02 NOTE — PROGRESS NOTES
[facility] [address], [address] [facility], [address] [place] [address]    Office [phone],[place] [phone]           Jaden Haji is a 31 y.o. female presents to the office for follow-up evaluation of chest pain      Assessment/Recommendations:    ICD-10-CM ICD-9-CM    1. Chest pain, unspecified type  R07.9 786.50    2. LANDRY (dyspnea on exertion)  R06.00 786.09      Chest discomfort symptomsI do not suspect these are cardiovascular in nature. Echocardiogram shows normal LV function without significant valvular pathology. She does not have any risk factors for coronary artery disease, therefore I do not think further stress testing or angiography is warranted at this time. Discussed with patient she continues to have chest pain symptoms to be evaluated by primary care physician or possibly by gastroenterology. She potentially could have a esophageal spasms as etiology of her chest pain symptoms. I also advised patient to pretreat her asthma with her inhaler prior to exertion at work. She is not having symptoms concerning for PSVT. Follow-up 6 months sooner as needed        Subjective:  31 y.o. presents to the office for follow-up evaluation. Since last visit she underwent echocardiogram which showed normal LV function without any significant valvular pathology. She reports that she continues to have some discomfort and tightness in her chest.  She reports sometimes at work she will develop substernal chest tightening. At other times she will get random chest discomfort symptoms. She reports driving into the office today she developed substernal chest tightness that lasted for several minutes. She has not used her inhaler prior to exertion to see if this helps her chest pain symptoms. She does report having a history of a Loida-Toro tear in the past, which was treated with PPI therapy.       She is not aware of her family history, she is adopted from [facility]  No history of smoking, illicit drug use, alcohol use    Past Medical History:   Diagnosis Date    Hyperprolactinemia ([address]     Ill-defined condition     kidney infection     Migraines     Neurological disorder     chronic migraine with aura         Past Surgical History:   Procedure Laterality Date    HX WISDOM TEETH EXTRACTION           Current Outpatient Medications:     POTASSIUM-99 PO, Take  by mouth daily. , Disp: , Rfl:     ferrous sulfate (IRON PO), Take  by mouth daily. , Disp: , Rfl:     vit B complex no.12/niacin,B3, (VITAMIN B COMPLEX NO.12-NIACIN PO), Take  by mouth daily. , Disp: , Rfl:     Allergies   Allergen Reactions    Other Food Rash     CHERRY - rash, Blue Agave- anaphylaxis, cinnamon - rash    Imitrex [Sumatriptan Succinate] Other (comments)     confusion        Family History   Problem Relation Age of Onset    Headache Mother        Social History     Tobacco Use    Smoking status: Never Smoker    Smokeless tobacco: Never Used   Substance Use Topics    Alcohol use: Yes     Comment: Occ    Drug use: No     Unknown family history, adopted from [facility]      Review of Symptoms:  Pertinent Positive: Chest pain, exertional dyspnea, nausea  Pertinent Negative: No orthopnea, PND, palpitations  All Other systems reviewed and are negative for a Comprehensive ROS (10+)    Physical Exam    Blood pressure 118/82, pulse 72, height 5' 3\" (1.6 m), weight 140 lb 12.8 oz (63.9 kg), SpO2 96 %. Constitutional:  well-developed and well-nourished. No distress. HENT: Normocephalic. Eyes: No scleral icterus. Neck:  Neck supple. No JVD present. Pulmonary/Chest: Effort normal and breath sounds normal. No respiratory distress, wheezes or rales. Cardiovascular: Normal rate, regular rhythm, S1 S2 . Exam reveals no gallop and no friction rub. No murmur heard. No edema. Extremities:  Normal muscle tone  Abdominal:   No abnormal distension.    Neurological:  Moving all extremities, cranial nerves appear grossly intact. Skin: Skin is not cold. Not diaphoretic. No erythema. Psychiatric:  Grossly normal mood and affect. Intact insight. Objective Data:    ECG: personally reviewed and  interpreted  1/14/2021-sinus rhythm, nonspecific intraventricular conduction delay    02/24/21   ECHO ADULT COMPLETE 02/24/2021 2/24/2021    Narrative · LV: Estimated LVEF is 60 - 65%. Biplane method used to measure ejection   fraction. Normal cavity size, wall thickness, systolic function (ejection   fraction normal) and diastolic function. Wall motion: normal.        Signed by: DO Jennifer Munoz DO          ATTENTION:   This medical record was transcribed using an electronic medical records/speech recognition system. Although proofread, it may and can contain electronic, spelling and other errors. Corrections may be executed at a later time. Please feel free to contact us for any clarifications as needed.

## 2021-03-02 NOTE — PROGRESS NOTES
Cathlyn Oppenheim is a 31 y.o. female    Chief Complaint   Patient presents with    Chest Pain    Follow-up     2-3 week f/u LANDRY       Chest pain patient states some chest tightness that comes and goes     SOB patient states some SOB     Dizziness with physical activity     Swelling No    Refills No    Visit Vitals  /82 (BP 1 Location: Left upper arm, BP Patient Position: Sitting)   Pulse 72   Ht 5' 3\" (1.6 m)   Wt 140 lb 12.8 oz (63.9 kg)   SpO2 96%   BMI 24.94 kg/m²       1. Have you been to the ER, urgent care clinic since your last visit? Hospitalized since your last visit? No    2. Have you seen or consulted any other health care providers outside of the [address] [place] [address] since your last visit? Include any pap smears or colon screening.   No

## 2021-04-02 ENCOUNTER — HOSPITAL ENCOUNTER (EMERGENCY)
Age: 31
Discharge: HOME | End: 2021-04-02
Attending: EMERGENCY MEDICINE
Payer: COMMERCIAL

## 2021-04-02 VITALS
SYSTOLIC BLOOD PRESSURE: 138 MMHG | OXYGEN SATURATION: 100 % | TEMPERATURE: 98.3 F | HEART RATE: 90 BPM | RESPIRATION RATE: 16 BRPM | DIASTOLIC BLOOD PRESSURE: 91 MMHG

## 2021-04-02 DIAGNOSIS — U07.1 COVID-19: Primary | ICD-10-CM

## 2021-04-02 DIAGNOSIS — J45.21 MILD INTERMITTENT ASTHMA WITH ACUTE EXACERBATION: ICD-10-CM

## 2021-04-02 PROCEDURE — 99282 EMERGENCY DEPT VISIT SF MDM: CPT

## 2021-04-02 RX ORDER — PREDNISONE 50 MG/1
50 TABLET ORAL DAILY
Qty: 5 TAB | Refills: 0 | Status: SHIPPED | OUTPATIENT
Start: 2021-04-02 | End: 2021-04-07

## 2021-04-02 RX ORDER — ASCORBIC ACID 500 MG
500 TABLET ORAL 2 TIMES DAILY
Qty: 30 TAB | Refills: 0 | Status: SHIPPED | OUTPATIENT
Start: 2021-04-02 | End: 2021-04-17

## 2021-04-02 NOTE — DISCHARGE INSTRUCTIONS
Your physician or healthcare provider has determined that you are at risk for the Coronavirus (COVID-19). If you have met CDC criteria, your physician may have sent a laboratory test.  Please adhere to the following restrictions until you obtain your results or are cleared by your primary care doctor:    Stay at home except to get medical care. Seek medical attention if you develop worsening symptoms or new concerns such as severe shortness of breath, chest pain, etc.    Separate yourself from other people and animals in your home. If possible, stay in a separate room and use a separate bathroom from others in your house. Restrict contact with pets, as there is a possibility of transmission of the virus. Call your doctor before showing up at their office. Let them know you have or may have COVID-19    Wear a facemask when you are around other people. Cover your mouth when you cough or sneeze. Wash your hands often with warm soapy water for at least 20 seconds. If soap and water are not available, use an alcohol based hand . Clean all high touch surfaces everyday. For example: counters, tabletops, doorknobs, bathroom fixtures, toilets, phones, keyboards, tablets, and bedside tables. Monitor your symptoms at home. Seek prompt medical attention if you symptoms worsen. (i.e. difficulty breathing). Call your healthcare provider before coming and tell them you may have COVID-19. Wear a mask upon entering the facility. Stay at home until all these things have happened:   You have had no fevers for at least 24 hours (without fever reducing meds)  AND  Your other symptoms have improved  (e.g. cough, shortness of breath) AND  At least 10 days have passed since the beginning of your symptoms      Source: CDC website ([employer])      If you have further questions about the Coronavirus (COVID-19), please contact the [place] Department of Health COVID-19 Hotline at 8-635.559.6930, the [address] [place] [address] at [phone] or [facility] [phone].

## 2021-04-02 NOTE — Clinical Note
[address] 
[address] [facility] [address] [phone] 
[phone] Work/School Note Date: 4/2/2021 To Whom It May concern: 
 
Salvador Agosto was evaulated by the following provider(s): 
Attending Provider: Juan Fairbanks MD 
Physician Assistant: Laine Etienne virus is suspected. Per the CDC guidelines we recommend home isolation until the following conditions are all met: 1. At least 10 days have passed since symptoms first appeared and 2. At least 24 hours have passed since last fever without the use of fever-reducing medications and 
3. Symptoms (e.g., cough, shortness of breath) have improved Sincerely, Jose Crews PA-C

## 2021-04-02 NOTE — ED TRIAGE NOTES
Triage: Pt arrives ambulatory from home with CC of shortness of breath, fatigue, body aches, and chills. Pt reports she had a coworker recently test positive for covid. She had a covid test yesterday but has yet to receive the results.

## 2021-04-02 NOTE — ED PROVIDER NOTES
Date of Service:  (Not on file)    Patient:  Katja Law    Chief Complaint:  Concern For COVID-19 (Coronavirus)       HPI:  Katja Law is a 31 y.o.  female who presents for evaluation of possible COVID. Chills, bodyaches, generalized weakness, loss of smell, nausea, dizziness, nasal congestion. Taking ibuprofen, last dosage of 10:30am this morning. Co-worker tested positive yesterday, direct contact with them daily. COVID test yesterday, no results yet. Uses inhaler daily, endorses multiple asthma exacerbations yearly with need for steroids, no admission. States she feels as though her asthma is flaring up. PMH: Asthma (mild intermittent), Migraines, Kidney infection, hyperprolactinemia  Surgeries: wisdom teeth removal  Allergies: Imitrex  No smoking/occ ETOH/no drug    I classify this patient as at low risk for PE and the Pt does not meet criteria by [facility] score for d-dimer testing. 1. Age <50  2. HR<100  3. SpO2 >95%  4. No unilateral leg swelling  5. No hemoptysis  6. No recent trauma or surgery  7. No prior DVT or PE  8.  No hormone use             Past Medical History:   Diagnosis Date    Hyperprolactinemia ([facility] [address]     Ill-defined condition     kidney infection     Migraines     Neurological disorder     chronic migraine with aura        Past Surgical History:   Procedure Laterality Date    HX WISDOM TEETH EXTRACTION           Family History:   Problem Relation Age of Onset    Headache Mother        Social History     Socioeconomic History    Marital status: SINGLE     Spouse name: Not on file    Number of children: Not on file    Years of education: Not on file    Highest education level: Not on file   Occupational History    Not on file   Social Needs    Financial resource strain: Not on file    Food insecurity     Worry: Not on file     Inability: Not on file    Transportation needs     Medical: Not on file     Non-medical: Not on file   Tobacco Use    Smoking status: Never Smoker  Smokeless tobacco: Never Used   Substance and Sexual Activity    Alcohol use: Yes     Comment: Occ    Drug use: No    Sexual activity: Not on file   Lifestyle    Physical activity     Days per week: Not on file     Minutes per session: Not on file    Stress: Not on file   Relationships    Social connections     Talks on phone: Not on file     Gets together: Not on file     Attends [employer] service: Not on file     Active member of club or organization: Not on file     Attends meetings of clubs or organizations: Not on file     Relationship status: Not on file    Intimate partner violence     Fear of current or ex partner: Not on file     Emotionally abused: Not on file     Physically abused: Not on file     Forced sexual activity: Not on file   Other Topics Concern    Not on file   Social History Narrative    ** Merged History Encounter **              ALLERGIES: Other food and Imitrex [sumatriptan succinate]    Review of Systems   Constitutional: Positive for chills. Negative for fever. HENT: Negative for trouble swallowing. Eyes: Negative for redness. Respiratory: Positive for cough and shortness of breath. Cardiovascular: Negative for chest pain. Gastrointestinal: Negative for abdominal pain, diarrhea, nausea and vomiting. Genitourinary: Negative for dysuria. Musculoskeletal: Negative for gait problem. Skin: Negative for rash. Neurological: Negative for syncope. Vitals:    04/02/21 1723   BP: (!) 138/91   Pulse: 90   Resp: 16   Temp: 98.3 °F (36.8 °C)   SpO2: 100%            Physical Exam  Vitals signs and nursing note reviewed. Constitutional:       Appearance: Normal appearance. HENT:      Head: Normocephalic and atraumatic. Nose: Nose normal.   Eyes:      Extraocular Movements: Extraocular movements intact. Conjunctiva/sclera: Conjunctivae normal.      Pupils: Pupils are equal, round, and reactive to light.    Neck:      Musculoskeletal: Normal range of motion and neck supple. Cardiovascular:      Rate and Rhythm: Normal rate and regular rhythm. Pulses: Normal pulses. Radial pulses are 2+ on the right side and 2+ on the left side. Posterior tibial pulses are 2+ on the right side and 2+ on the left side. Heart sounds: Normal heart sounds. Pulmonary:      Effort: Pulmonary effort is normal.      Breath sounds: Normal breath sounds. Abdominal:      General: Abdomen is flat. Bowel sounds are normal.      Palpations: Abdomen is soft. Tenderness: There is no abdominal tenderness. There is no right CVA tenderness, left CVA tenderness, guarding or rebound. Musculoskeletal: Normal range of motion. Skin:     General: Skin is warm and dry. Neurological:      General: No focal deficit present. Mental Status: She is alert and oriented to person, place, and time. Mental status is at baseline. Psychiatric:         Mood and Affect: Mood normal.         Behavior: Behavior normal.         Thought Content: Thought content normal.          MDM  Number of Diagnoses or Management Options  COVID-19  Mild intermittent asthma with acute exacerbation  Diagnosis management comments: DECISION MAKING:  Katja Law is a 31 y.o. female who comes in as above. Concern for covid, with chills, bodyaches, fatigue, shortness of breath, cough, congestion, loss of smell with contact with coworker (positive for COVID). Vitals stable, oxgyen 100% at rest, 99% with ambulation. Lungs CTAB. Uses inhaler daily over the past couple days, endorses need for steroids multiple times per year. States she feels asthma is starting to flare up, using inhaler with improvement. Will start zinc, vitamin C for COVID, and prednisone once daily for steroids. Strict ED return precautions given. Patient management discussed with attending physician.        IMPRESSION:  COVID-19  (primary encounter diagnosis)  Mild intermittent asthma with acute exacerbation    DISPOSITION:        Current Discharge Medication List    START taking these medications    ascorbic acid, vitamin C, (Vitamin C) 500 mg tablet  Take 1 Tab by mouth two (2) times a day for 15 days. Qty: 30 Tab Refills: 0    zinc sulfate 66 mg tab  Take 135 mg by mouth daily for 7 days. Qty: 7 Tab Refills: 0    predniSONE (DELTASONE) 50 mg tablet  Take 1 Tab by mouth daily for 5 days. Qty: 5 Tab Refills: 0           Follow-up Information     Follow up With Specialties Details Why Contact Info    [address], [facility] Go to  If symptoms worsen [address] [address]  [phone]          The patient is asked to follow-up with their primary care provider in the next several days. They are to call tomorrow for an appointment. The patient is asked to return promptly for any increased concerns or worsening of symptoms. They can return to this emergency department or any other emergency department.       ED Course as of Apr 02 1754 Fri Apr 02, 2021   1753 Oxygen saturation 100% at rest, 99% with ambulation    [EK]      ED Course User Index  [EK] Evelin Alfred PA-C       Procedures

## 2021-04-03 ENCOUNTER — PATIENT OUTREACH (OUTPATIENT)
Dept: CASE MANAGEMENT | Age: 31
End: 2021-04-03

## 2021-04-03 NOTE — PROGRESS NOTES
Patient contacted regarding Nikos Aldridge. Discussed COVID-19 related testing which was not done at this time. Test results were not done. Patient informed of results, if available? no     LPN Care Coordinator contacted the patient by telephone to perform post discharge assessment. Call within 2 business days of discharge: Yes Verified name and  with patient as identifiers. Provided introduction to self, and explanation of the CTN/ACM role, and reason for call due to risk factors for infection and/or exposure to COVID-19. Symptoms reviewed with patient who verbalized the following symptoms: no worsening symptoms and no taste or smell      Due to no new or worsening symptoms encounter was not routed to provider for escalation. Discussed follow-up appointments. If no appointment was previously scheduled, appointment scheduling offered:  no   [address] follow up appointment(s):   Future Appointments   Date Time Provider Suman Brown   9/3/2021 11:40 AM Madelyn Blake DO [facility]     Non-BS follow up appointment(s): n/a     Advance Care Planning:   Does patient have an Advance Directive:  patient declined education. Declined to name health care decision maker. Patient has following risk factors of: no known risk factors. LPN CC reviewed discharge instructions, medical action plan and red flags such as increased shortness of breath, increasing fever and signs of decompensation with patient who verbalized understanding. Discussed exposure protocols and quarantine with CDC Guidelines What to do if you are sick with coronavirus disease .  Patient was given an opportunity for questions and concerns. The patient agrees to contact the [facility] exposure line [phone], local [facility] department [facility]  ([phone] and PCP office for questions related to their healthcare. LPN CC provided contact information for future needs.     Reviewed and educated patient on any new and changed medications related to discharge diagnosis     Was patient discharged with a pulse oximeter? no Discussed and confirmed pulse oximeter discharge instructions and when to notify provider or seek emergency care. Patient/family/caregiver given information [facility] and agrees to enroll yes  Patient's preferred e-mail: n/a   Patient's preferred phone number: [phone]  Based on Loop alert triggers, patient will be contacted by nurse care manager for worsening symptoms. Pt will be further monitored by COVID Loop Team based on severity of symptoms and risk factors.

## 2023-05-01 NOTE — ED NOTES
I have reviewed discharge instructions with the patient. The patient verbalized understanding. Time allotted for questions. VSS. Pt ambulatory out of unit. Minocycline Counseling: Patient advised regarding possible photosensitivity and discoloration of the teeth, skin, lips, tongue and gums.  Patient instructed to avoid sunlight, if possible.  When exposed to sunlight, patients should wear protective clothing, sunglasses, and sunscreen.  The patient was instructed to call the office immediately if the following severe adverse effects occur:  hearing changes, easy bruising/bleeding, severe headache, or vision changes.  The patient verbalized understanding of the proper use and possible adverse effects of minocycline.  All of the patient's questions and concerns were addressed.

## 2024-09-09 SDOH — HEALTH STABILITY: PHYSICAL HEALTH: ON AVERAGE, HOW MANY DAYS PER WEEK DO YOU ENGAGE IN MODERATE TO STRENUOUS EXERCISE (LIKE A BRISK WALK)?: 5 DAYS

## 2024-09-09 SDOH — HEALTH STABILITY: PHYSICAL HEALTH: ON AVERAGE, HOW MANY MINUTES DO YOU ENGAGE IN EXERCISE AT THIS LEVEL?: 60 MIN

## 2024-09-10 ENCOUNTER — OFFICE VISIT (OUTPATIENT)
Facility: CLINIC | Age: 34
End: 2024-09-10
Payer: COMMERCIAL

## 2024-09-10 VITALS
HEART RATE: 88 BPM | DIASTOLIC BLOOD PRESSURE: 73 MMHG | OXYGEN SATURATION: 100 % | BODY MASS INDEX: 25.57 KG/M2 | HEIGHT: 63 IN | TEMPERATURE: 98.2 F | SYSTOLIC BLOOD PRESSURE: 102 MMHG | WEIGHT: 144.3 LBS

## 2024-09-10 DIAGNOSIS — Z76.89 ENCOUNTER TO ESTABLISH CARE WITH NEW DOCTOR: Primary | ICD-10-CM

## 2024-09-10 DIAGNOSIS — G43.109 MIGRAINE WITH AURA AND WITHOUT STATUS MIGRAINOSUS, NOT INTRACTABLE: ICD-10-CM

## 2024-09-10 DIAGNOSIS — J45.20 MILD INTERMITTENT ASTHMA WITHOUT COMPLICATION: ICD-10-CM

## 2024-09-10 PROCEDURE — G8419 CALC BMI OUT NRM PARAM NOF/U: HCPCS | Performed by: INTERNAL MEDICINE

## 2024-09-10 PROCEDURE — 99203 OFFICE O/P NEW LOW 30 MIN: CPT | Performed by: INTERNAL MEDICINE

## 2024-09-10 PROCEDURE — 1036F TOBACCO NON-USER: CPT | Performed by: INTERNAL MEDICINE

## 2024-09-10 PROCEDURE — G8427 DOCREV CUR MEDS BY ELIG CLIN: HCPCS | Performed by: INTERNAL MEDICINE

## 2024-09-10 RX ORDER — ALBUTEROL SULFATE 90 UG/1
2 AEROSOL, METERED RESPIRATORY (INHALATION) 4 TIMES DAILY PRN
Qty: 2 EACH | Refills: 2 | Status: SHIPPED | OUTPATIENT
Start: 2024-09-10

## 2024-09-10 SDOH — ECONOMIC STABILITY: FOOD INSECURITY: WITHIN THE PAST 12 MONTHS, YOU WORRIED THAT YOUR FOOD WOULD RUN OUT BEFORE YOU GOT MONEY TO BUY MORE.: NEVER TRUE

## 2024-09-10 SDOH — ECONOMIC STABILITY: FOOD INSECURITY: WITHIN THE PAST 12 MONTHS, THE FOOD YOU BOUGHT JUST DIDN'T LAST AND YOU DIDN'T HAVE MONEY TO GET MORE.: NEVER TRUE

## 2024-09-10 SDOH — ECONOMIC STABILITY: INCOME INSECURITY: HOW HARD IS IT FOR YOU TO PAY FOR THE VERY BASICS LIKE FOOD, HOUSING, MEDICAL CARE, AND HEATING?: NOT HARD AT ALL

## 2024-09-10 ASSESSMENT — PATIENT HEALTH QUESTIONNAIRE - PHQ9
SUM OF ALL RESPONSES TO PHQ QUESTIONS 1-9: 0
1. LITTLE INTEREST OR PLEASURE IN DOING THINGS: NOT AT ALL
SUM OF ALL RESPONSES TO PHQ QUESTIONS 1-9: 0
2. FEELING DOWN, DEPRESSED OR HOPELESS: NOT AT ALL
SUM OF ALL RESPONSES TO PHQ QUESTIONS 1-9: 0
SUM OF ALL RESPONSES TO PHQ9 QUESTIONS 1 & 2: 0
SUM OF ALL RESPONSES TO PHQ QUESTIONS 1-9: 0

## 2024-09-10 ASSESSMENT — ANXIETY QUESTIONNAIRES
5. BEING SO RESTLESS THAT IT IS HARD TO SIT STILL: NOT AT ALL
IF YOU CHECKED OFF ANY PROBLEMS ON THIS QUESTIONNAIRE, HOW DIFFICULT HAVE THESE PROBLEMS MADE IT FOR YOU TO DO YOUR WORK, TAKE CARE OF THINGS AT HOME, OR GET ALONG WITH OTHER PEOPLE: NOT DIFFICULT AT ALL
2. NOT BEING ABLE TO STOP OR CONTROL WORRYING: NOT AT ALL
4. TROUBLE RELAXING: NOT AT ALL
6. BECOMING EASILY ANNOYED OR IRRITABLE: NOT AT ALL
3. WORRYING TOO MUCH ABOUT DIFFERENT THINGS: NOT AT ALL
7. FEELING AFRAID AS IF SOMETHING AWFUL MIGHT HAPPEN: NOT AT ALL
1. FEELING NERVOUS, ANXIOUS, OR ON EDGE: NOT AT ALL
GAD7 TOTAL SCORE: 0